# Patient Record
Sex: FEMALE | Race: BLACK OR AFRICAN AMERICAN | NOT HISPANIC OR LATINO | Employment: UNEMPLOYED | URBAN - METROPOLITAN AREA
[De-identification: names, ages, dates, MRNs, and addresses within clinical notes are randomized per-mention and may not be internally consistent; named-entity substitution may affect disease eponyms.]

---

## 2017-10-30 ENCOUNTER — HOSPITAL ENCOUNTER (EMERGENCY)
Facility: HOSPITAL | Age: 27
Discharge: HOME | End: 2017-10-30
Attending: EMERGENCY MEDICINE
Payer: MEDICAID

## 2017-10-30 VITALS
TEMPERATURE: 98 F | BODY MASS INDEX: 40.12 KG/M2 | HEIGHT: 64 IN | OXYGEN SATURATION: 99 % | WEIGHT: 235 LBS | RESPIRATION RATE: 20 BRPM | HEART RATE: 92 BPM | DIASTOLIC BLOOD PRESSURE: 88 MMHG | SYSTOLIC BLOOD PRESSURE: 150 MMHG

## 2017-10-30 DIAGNOSIS — M54.50 LOW BACK PAIN: ICD-10-CM

## 2017-10-30 LAB
B-HCG UR QL: NEGATIVE
CTP QC/QA: YES

## 2017-10-30 PROCEDURE — 25000003 PHARM REV CODE 250: Performed by: PHYSICIAN ASSISTANT

## 2017-10-30 PROCEDURE — 81025 URINE PREGNANCY TEST: CPT | Performed by: EMERGENCY MEDICINE

## 2017-10-30 PROCEDURE — 99284 EMERGENCY DEPT VISIT MOD MDM: CPT | Mod: 25

## 2017-10-30 RX ORDER — ACETAMINOPHEN 325 MG/1
650 TABLET ORAL
Status: DISCONTINUED | OUTPATIENT
Start: 2017-10-30 | End: 2017-10-30 | Stop reason: HOSPADM

## 2017-10-30 RX ORDER — ACETAMINOPHEN 325 MG/1
650 TABLET ORAL
Status: COMPLETED | OUTPATIENT
Start: 2017-10-30 | End: 2017-10-30

## 2017-10-30 RX ORDER — DEXTROMETHORPHAN HYDROBROMIDE, GUAIFENESIN 5; 100 MG/5ML; MG/5ML
650 LIQUID ORAL 3 TIMES DAILY PRN
Qty: 20 TABLET | Refills: 0 | Status: SHIPPED | OUTPATIENT
Start: 2017-10-30 | End: 2018-06-25 | Stop reason: ALTCHOICE

## 2017-10-30 RX ORDER — ORPHENADRINE CITRATE 100 MG/1
100 TABLET, EXTENDED RELEASE ORAL 2 TIMES DAILY PRN
Qty: 20 TABLET | Refills: 0 | Status: SHIPPED | OUTPATIENT
Start: 2017-10-30 | End: 2017-11-09

## 2017-10-30 RX ADMIN — ACETAMINOPHEN 325 MG: 325 TABLET ORAL at 09:10

## 2017-10-30 NOTE — DISCHARGE INSTRUCTIONS
Take medication as prescribed.  Follow-up with primary care physician this week for reevaluation.  Return to ED if any other problems occur.

## 2017-10-30 NOTE — ED PROVIDER NOTES
"Encounter Date: 10/30/2017    SCRIBE #1 NOTE: I, Jairo Hartley, am scribing for, and in the presence of,  Michael Tamayo PA-C. I have scribed the following portions of the note - Other sections scribed: HPI, ROS.       History     Chief Complaint   Patient presents with    Back Pain     "everytime I sit or stand up I feel like I got a pinch right in the middle" xcouple months     CC: Back Pain    HPI: This 27 y.o. Female with PMHx epilepsy, and PSHx cholecystectomy presents to the ED c/o lower middle back pain. Pt describes the pain as "pinches" and denies pain radiation. Pt reports exacerbation when she moves and when laying down to sleep. Pt denies numbness & tingling to the groin, frequency, dysuria, hematuria, vaginal pain, vaginal bleeding, headache, nausea, vomiting, lightheadedness, or dizziness. No prior tx reported at this time. Pt denies recent injury. Pt says she has had back pain since receiving an epidural for her previous pregnancy. Pt reports giving birth in 2011. Pt says her back pain medication has not been working and she is looking for a new doctor. Pt says she is on depo shots. Pt denies past hx HTN.  No radiculopathy or paresthesia.  Symptoms are intermittent.      The history is provided by the patient. No  was used.     Review of patient's allergies indicates:  No Known Allergies  Past Medical History:   Diagnosis Date    Epilepsy      Past Surgical History:   Procedure Laterality Date    CHOLECYSTECTOMY       History reviewed. No pertinent family history.  Social History   Substance Use Topics    Smoking status: Never Smoker    Smokeless tobacco: Never Used    Alcohol use No     Review of Systems   Constitutional: Negative for fever.   HENT: Negative for sore throat.    Respiratory: Negative for shortness of breath.    Cardiovascular: Negative for chest pain.   Gastrointestinal: Negative for nausea and vomiting.   Genitourinary: Negative for dysuria, frequency, " hematuria, vaginal bleeding and vaginal pain.   Musculoskeletal: Positive for back pain (lower middle).   Skin: Negative for rash.   Neurological: Negative for dizziness, weakness, light-headedness, numbness and headaches.   Hematological: Does not bruise/bleed easily.       Physical Exam     Initial Vitals [10/30/17 0902]   BP Pulse Resp Temp SpO2   (!) 157/100 100 20 97.8 °F (36.6 °C) 98 %      MAP       119         Physical Exam    Nursing note and vitals reviewed.  Constitutional: She appears well-developed and well-nourished. She is not diaphoretic. No distress.   HENT:   Head: Normocephalic and atraumatic.   Eyes: Conjunctivae and EOM are normal. Pupils are equal, round, and reactive to light.   Neck: Normal range of motion. Neck supple. No tracheal deviation present.   Cardiovascular: Normal heart sounds.   Pulmonary/Chest: Breath sounds normal. No stridor. No respiratory distress. She has no wheezes.   Abdominal: Soft. Bowel sounds are normal. She exhibits no distension. There is no tenderness.   Musculoskeletal: Normal range of motion.   Mild TTP upper aspect of lumbar spine.  No obvious bony abnormality or deformity.  No swelling.  No ecchymosis or open wound.  No significant paraspinal musculature tenderness to palpation.   Lymphadenopathy:     She has no cervical adenopathy.   Neurological: She is alert and oriented to person, place, and time.   Skin: Skin is warm and dry. Capillary refill takes less than 2 seconds.   Psychiatric: She has a normal mood and affect. Her behavior is normal. Judgment and thought content normal.         ED Course   Procedures  Labs Reviewed   POCT URINE PREGNANCY             Medical Decision Making:   Initial Assessment:   27-year-old female chief complaint low back pain times years.  No recent trauma.  No radiculopathy or paresthesia.  Differential Diagnosis:   Contusion, fracture, sprain/strain, muscle spasm, nerve impingement  Clinical Tests:   Radiological Study: Ordered  and Reviewed  ED Management:  Patient overall well-appearing, in no acute distress, afebrile, she is hypertensive. She denies lightheadedness/dizziness, headache, n/v, or visual disturbance.       Patient presents to ED complaining of multiple years of lower back pain.  Patient denies trauma or recent injury.  She states she suspects back pain originated from her epidural injection during her last pregnancy.  Patient denies radiculopathy or paresthesia.  She does state the pain is sharp and worse with movement.  She denies saddle anesthesia.  No GI/ issues, no bowel or bladder incontinence.  I do not suspect cauda equina or emergent spinal process.  No difficulty with ambulation.  On exam, very mild tenderness to palpation to upper aspect of lumbar spine without obvious bony abnormality or deformity.  No step-off.  No swelling, ecchymosis, or open wound.  Warmth or erythema.  No suspicion for cellulitis.  X-ray negative for acute fracture.  I will discharge with anti-inflammatories, muscle relaxers, and have patient follow-up with her primary for reevaluation.  She does understand and agree.  Return precautions given.  I feel she is safe and stable for discharge without further intervention.  Other:   I have discussed this case with another health care provider.       <> Summary of the Discussion: I have discussed this case with Dr. Brito.            Scribe Attestation:   Scribe #1: I performed the above scribed service and the documentation accurately describes the services I performed. I attest to the accuracy of the note.    Attending Attestation:     Physician Attestation Statement for NP/PA:   I discussed this assessment and plan of this patient with the NP/PA, but I did not personally examine the patient. The face to face encounter was performed by the NP/PA.        Physician Attestation for Scribe:  Physician Attestation Statement for Scribe #1: I, Michael Tamayo PA-C, reviewed documentation, as scribed  by Jairo Hartley in my presence, and it is both accurate and complete.                 ED Course      Clinical Impression:   The encounter diagnosis was Low back pain.    Disposition:   Disposition: Discharged  Condition: Stable                        Michael Tamayo PA-C  10/30/17 1027       Michael Tamyao PA-C  10/30/17 1033       Jarad Brito MD  10/31/17 6624

## 2018-06-25 ENCOUNTER — HOSPITAL ENCOUNTER (EMERGENCY)
Facility: HOSPITAL | Age: 28
Discharge: HOME | End: 2018-06-25
Payer: MEDICAID

## 2018-06-25 VITALS
BODY MASS INDEX: 41.48 KG/M2 | RESPIRATION RATE: 19 BRPM | HEART RATE: 108 BPM | OXYGEN SATURATION: 100 % | TEMPERATURE: 99 F | DIASTOLIC BLOOD PRESSURE: 92 MMHG | SYSTOLIC BLOOD PRESSURE: 143 MMHG | HEIGHT: 64 IN | WEIGHT: 243 LBS

## 2018-06-25 DIAGNOSIS — B00.1 FEVER BLISTER: Primary | ICD-10-CM

## 2018-06-25 LAB
B-HCG UR QL: NEGATIVE
CTP QC/QA: YES

## 2018-06-25 PROCEDURE — 81025 URINE PREGNANCY TEST: CPT | Performed by: EMERGENCY MEDICINE

## 2018-06-25 PROCEDURE — 99283 EMERGENCY DEPT VISIT LOW MDM: CPT

## 2018-06-25 RX ORDER — IBUPROFEN 600 MG/1
600 TABLET ORAL EVERY 6 HOURS PRN
Qty: 20 TABLET | Refills: 0 | Status: SHIPPED | OUTPATIENT
Start: 2018-06-25 | End: 2018-06-28

## 2018-06-25 RX ORDER — VALACYCLOVIR HYDROCHLORIDE 1 G/1
2000 TABLET, FILM COATED ORAL EVERY 12 HOURS
Qty: 4 TABLET | Refills: 0 | Status: SHIPPED | OUTPATIENT
Start: 2018-06-25 | End: 2018-07-03

## 2018-06-25 NOTE — ED NOTES
Neuro: AAOx3  Resp: Airway patent, respirations even/unlabored  Cardiac: Skin pink/warm/dry, pulses intact  Abdomen: Soft, non-tender to palpation, denies N/V/D  Musculoskeletal: Moves all extremities equally, ROM intact  Swelling to bottom lip x's 2 days

## 2018-06-25 NOTE — ED PROVIDER NOTES
Encounter Date: 6/25/2018       History     Chief Complaint   Patient presents with    Lip Swelling     pt reports she woke up with bottom lip swollen this morning. pt reports using an OTC medicine for a mouth lesion last week.     28 y/o female with fever blister for 1 week. Pt states it is getting worse. She denies fever, chills, SOB, or chest pain. She states that she has experience this before but it has not lasted that long. Pt contacted her PCP and a medication was called in, but only 1 pill. She does not know the name of the medication.       The history is provided by the patient.     Review of patient's allergies indicates:  No Known Allergies  Past Medical History:   Diagnosis Date    Epilepsy      Past Surgical History:   Procedure Laterality Date    CHOLECYSTECTOMY       History reviewed. No pertinent family history.  Social History   Substance Use Topics    Smoking status: Never Smoker    Smokeless tobacco: Never Used    Alcohol use No     Review of Systems   Constitutional: Negative for chills and fever.   HENT: Positive for mouth sores. Negative for drooling and ear discharge.    Respiratory: Negative.  Negative for shortness of breath.    Cardiovascular: Negative.  Negative for chest pain.       Physical Exam     Initial Vitals [06/25/18 1743]   BP Pulse Resp Temp SpO2   (!) 143/92 108 19 98.9 °F (37.2 °C) 100 %      MAP       --         Physical Exam    Nursing note and vitals reviewed.  Constitutional: She appears well-developed and well-nourished.   HENT:   Mouth/Throat: Oropharynx is clear and moist and mucous membranes are normal.       Cardiovascular: Normal rate, regular rhythm, normal heart sounds and intact distal pulses. Exam reveals no gallop and no friction rub.    No murmur heard.  Pulmonary/Chest: Breath sounds normal. No respiratory distress. She has no wheezes. She has no rhonchi. She has no rales. She exhibits no tenderness.         ED Course   Procedures  Labs Reviewed   POCT  URINE PREGNANCY          Imaging Results    None          Medical Decision Making:   Initial Assessment:   26 y/o female which present with fever blister to lower lip  Differential Diagnosis:   Herpes virus, impetigo  ED Management:  Pt examined. Pt did not receive full prescription for treatment of herpes fever blister or was resistant. Meds sent to pharmacy. Pt educated on importance of returning to ED with worsening of symptoms or signs of infection. Pt voiced understanding.                       Clinical Impression:   The encounter diagnosis was Fever blister.                             Tiffanie Wiggins, [facility]  06/25/18 2544

## 2018-07-03 ENCOUNTER — HOSPITAL ENCOUNTER (EMERGENCY)
Facility: HOSPITAL | Age: 28
Discharge: HOME | End: 2018-07-03
Attending: EMERGENCY MEDICINE
Payer: MEDICAID

## 2018-07-03 VITALS
HEART RATE: 100 BPM | TEMPERATURE: 98 F | WEIGHT: 235 LBS | RESPIRATION RATE: 16 BRPM | SYSTOLIC BLOOD PRESSURE: 126 MMHG | DIASTOLIC BLOOD PRESSURE: 87 MMHG | OXYGEN SATURATION: 99 % | BODY MASS INDEX: 40.34 KG/M2

## 2018-07-03 DIAGNOSIS — B00.1 FEVER BLISTER: Primary | ICD-10-CM

## 2018-07-03 LAB
B-HCG UR QL: NEGATIVE
CTP QC/QA: YES

## 2018-07-03 PROCEDURE — 81025 URINE PREGNANCY TEST: CPT | Performed by: EMERGENCY MEDICINE

## 2018-07-03 PROCEDURE — 99284 EMERGENCY DEPT VISIT MOD MDM: CPT | Mod: 25

## 2018-07-03 RX ORDER — LIDOCAINE HYDROCHLORIDE 20 MG/ML
SOLUTION OROPHARYNGEAL
Qty: 30 ML | Refills: 0 | Status: SHIPPED | OUTPATIENT
Start: 2018-07-03 | End: 2021-11-29

## 2018-07-03 RX ORDER — ACYCLOVIR 50 MG/G
OINTMENT TOPICAL
Qty: 30 G | Refills: 0 | Status: SHIPPED | OUTPATIENT
Start: 2018-07-03 | End: 2018-07-17

## 2018-07-03 RX ORDER — IBUPROFEN 600 MG/1
600 TABLET ORAL EVERY 6 HOURS PRN
Qty: 20 TABLET | Refills: 0 | OUTPATIENT
Start: 2018-07-03 | End: 2018-09-06

## 2018-07-03 RX ORDER — ACETAMINOPHEN 500 MG
1000 TABLET ORAL EVERY 6 HOURS PRN
Qty: 30 TABLET | Refills: 0 | OUTPATIENT
Start: 2018-07-03 | End: 2019-09-15

## 2018-07-03 RX ORDER — VALACYCLOVIR HYDROCHLORIDE 1 G/1
2000 TABLET, FILM COATED ORAL EVERY 12 HOURS
Qty: 4 TABLET | Refills: 1 | Status: SHIPPED | OUTPATIENT
Start: 2018-07-03 | End: 2021-11-29

## 2018-07-03 NOTE — ED NOTES
Neuro: AAOx3  HEENT: WDL  Resp: Airway patent, respirations even/unlabored. No SOB noted.  Cardiac: Skin pink/warm/dry, pulses intact. Pt denies any chest pain  Abdomen: Soft, non-tender to palpation, denies N/V/D  : No signs or symptoms of urinary disturbances  Musculoskeletal: Moves all extremities equally, ROM intact  Skin: Pt reports blisters on lips since last week. Skin is dry.

## 2018-07-04 NOTE — ED PROVIDER NOTES
"Encounter Date: 7/3/2018       History     Chief Complaint   Patient presents with    fever blister     Pt states," [place] and it hurts."     27-year-old female chief complaint fever blister to both lips.  Patient states she has been applying Neosporin but it keep spreading.  Patient states initially she was seen here a few weeks ago for having fever blisters on her lower lip now she has a whole bunch on the upper lip.  Patient states the pain is horrible.  Patient states that is limiting her ability to eat.  Patient states the pain makes it hard to eat as much as she wants to.  She is able to eat some things.  She does cannot open her mouth wide.  Patient states this is our 1st fever blister.  It started after visiting the [employer].          Review of patient's allergies indicates:  No Known Allergies  Past Medical History:   Diagnosis Date    Epilepsy      Past Surgical History:   Procedure Laterality Date    CHOLECYSTECTOMY       No family history on file.  Social History   Substance Use Topics    Smoking status: Never Smoker    Smokeless tobacco: Never Used    Alcohol use No     Review of Systems   Constitutional: Negative for activity change, appetite change, chills and fever.   HENT: Negative for sore throat.    Respiratory: Negative for shortness of breath.    Cardiovascular: Negative for chest pain.   Gastrointestinal: Negative for nausea.   Genitourinary: Negative for dysuria.   Musculoskeletal: Negative for back pain.   Skin: Positive for wound. Negative for rash.   Neurological: Negative for weakness and headaches.   Hematological: Does not bruise/bleed easily.   All other systems reviewed and are negative.      Physical Exam     Initial Vitals [07/03/18 1702]   BP Pulse Resp Temp SpO2   126/87 100 16 98 °F (36.7 °C) 99 %      MAP       --         Physical Exam    Nursing note and vitals reviewed.  Constitutional: She appears well-developed and " well-nourished.   HENT:   Head: Normocephalic and atraumatic.   Right Ear: External ear normal.   Left Ear: External ear normal.   Nose: Nose normal.   Mouth/Throat: Oropharynx is clear and moist.       The multiple ulcerations to both upper and lower lips   Eyes: Conjunctivae and EOM are normal. Pupils are equal, round, and reactive to light.   Neck: Normal range of motion. Neck supple.   Cardiovascular: Normal rate, regular rhythm, normal heart sounds and intact distal pulses.   Pulmonary/Chest: Breath sounds normal. No respiratory distress. She has no wheezes. She has no rhonchi. She has no rales. She exhibits no tenderness.   Abdominal: Soft. Bowel sounds are normal. She exhibits no distension and no mass. There is no tenderness. There is no rebound and no guarding.   Musculoskeletal: Normal range of motion.   Neurological: She is alert and oriented to person, place, and time. She has normal strength. No cranial nerve deficit or sensory deficit.   Skin: Skin is warm and dry. Capillary refill takes less than 2 seconds. No abscess noted.   Psychiatric: She has a normal mood and affect.         ED Course   Procedures  Labs Reviewed   POCT URINE PREGNANCY          Imaging Results    None                               Clinical Impression:   The encounter diagnosis was Fever blister.                             Ling Ruff DO  07/04/18 1020

## 2018-07-07 ENCOUNTER — HOSPITAL ENCOUNTER (EMERGENCY)
Facility: HOSPITAL | Age: 28
Discharge: HOME | End: 2018-07-07
Attending: INTERNAL MEDICINE
Payer: MEDICAID

## 2018-07-07 VITALS
DIASTOLIC BLOOD PRESSURE: 85 MMHG | RESPIRATION RATE: 17 BRPM | OXYGEN SATURATION: 100 % | HEART RATE: 95 BPM | HEIGHT: 64 IN | SYSTOLIC BLOOD PRESSURE: 144 MMHG | TEMPERATURE: 98 F | BODY MASS INDEX: 40.12 KG/M2 | WEIGHT: 235 LBS

## 2018-07-07 DIAGNOSIS — L01.00 IMPETIGO: Primary | ICD-10-CM

## 2018-07-07 LAB
B-HCG UR QL: NEGATIVE
CTP QC/QA: YES

## 2018-07-07 PROCEDURE — 99283 EMERGENCY DEPT VISIT LOW MDM: CPT | Mod: 25

## 2018-07-07 PROCEDURE — 25000003 PHARM REV CODE 250: Performed by: INTERNAL MEDICINE

## 2018-07-07 PROCEDURE — 81025 URINE PREGNANCY TEST: CPT | Performed by: INTERNAL MEDICINE

## 2018-07-07 RX ORDER — MUPIROCIN 20 MG/G
OINTMENT TOPICAL 3 TIMES DAILY
Qty: 30 G | Refills: 1 | Status: SHIPPED | OUTPATIENT
Start: 2018-07-07 | End: 2018-07-14

## 2018-07-07 RX ORDER — CEPHALEXIN 500 MG/1
500 CAPSULE ORAL
Status: COMPLETED | OUTPATIENT
Start: 2018-07-07 | End: 2018-07-07

## 2018-07-07 RX ORDER — CEPHALEXIN 500 MG/1
500 CAPSULE ORAL EVERY 12 HOURS
Qty: 20 CAPSULE | Refills: 0 | Status: SHIPPED | OUTPATIENT
Start: 2018-07-07 | End: 2018-07-17

## 2018-07-07 RX ADMIN — CEPHALEXIN 500 MG: 500 CAPSULE ORAL at 08:07

## 2018-07-08 NOTE — ED PROVIDER NOTES
Encounter Date: 7/7/2018    SCRIBE #1 NOTE: I, Ceci Lewis, am scribing for, and in the presence of,  Dr. Landeros. I have scribed the entire note.       History     Chief Complaint   Patient presents with    Oral Swelling     pt c/o lip swelling and burn s/p taking cold sore medication x 1 week     This is a 27 y.o female who presents with upper and lower lip burns and swelling after taking cold sore ointment for 1 week. She notes discoloration on her upper and lower lips. She was treated here for a cold sore and was given medication. She has not followed up with her PCP for this complaint. She rates her pain as a 4/10. She denies nausea, fever, vomiting, and diarrhea.       The history is provided by the patient.     Review of patient's allergies indicates:  No Known Allergies  Past Medical History:   Diagnosis Date    Epilepsy      Past Surgical History:   Procedure Laterality Date    CHOLECYSTECTOMY       No family history on file.  Social History   Substance Use Topics    Smoking status: Never Smoker    Smokeless tobacco: Never Used    Alcohol use No     Review of Systems   Constitutional: Negative for chills and fever.   Gastrointestinal: Negative for nausea and vomiting.   Skin: Positive for wound.   All other systems reviewed and are negative.      Physical Exam     Initial Vitals [07/07/18 1942]   BP Pulse Resp Temp SpO2   (!) 144/85 95 18 98.3 °F (36.8 °C) 100 %      MAP       --         Physical Exam    Nursing note and vitals reviewed.  Constitutional: She appears well-developed and well-nourished. No distress.   HENT:   Head: Normocephalic and atraumatic.   Mouth/Throat: Oropharynx is clear and moist. No oropharyngeal exudate.   Eyes: EOM are normal.   Cardiovascular: Normal rate, regular rhythm and normal heart sounds. Exam reveals no gallop and no friction rub.    No murmur heard.  Pulmonary/Chest: Breath sounds normal. No respiratory distress. She has no wheezes. She has no rhonchi. She has no  rales.   Musculoskeletal: Normal range of motion.   Neurological: She is alert and oriented to person, place, and time.   Skin: Skin is warm and dry. No rash noted.   Multiple pustular lesions to the lower lip, some with honey colored crusting. Multiple healing ulcerative lesions to the upper lip.         ED Course   Procedures  Labs Reviewed   POCT URINE PREGNANCY          Imaging Results    None          Medical Decision Making:   Initial Assessment:   This is a 27 y.o female who presents with upper and lower lip burns and swelling after taking a cold sore ointment for 1 week. She notes discoloration on her upper and lower lips. She was treated here for a cold sore and was given medication. She has not followed up with her PCP for this complaint. She rates her pain as a 4/10. She denies nausea, fever, vomiting, and diarrhea.   Clinical Tests:   Lab Tests: Ordered and Reviewed            Scribe Attestation:   Scribe #1: I performed the above scribed service and the documentation accurately describes the services I performed. I attest to the accuracy of the note.        This document was produced by a scribe under my direction and in my presence. I agree with the content of the note and have made any necessary edits.     Dr. Landeros    07/18/2018 5:55 AM          Clinical Impression:     1. Impetigo        Disposition:   Disposition: Discharged  Condition: Stable                        Luigi Landeros MD  07/18/18 3320

## 2018-09-06 ENCOUNTER — HOSPITAL ENCOUNTER (EMERGENCY)
Facility: HOSPITAL | Age: 28
Discharge: HOME | End: 2018-09-06
Attending: EMERGENCY MEDICINE
Payer: MEDICAID

## 2018-09-06 VITALS
TEMPERATURE: 100 F | HEIGHT: 64 IN | HEART RATE: 90 BPM | OXYGEN SATURATION: 100 % | RESPIRATION RATE: 19 BRPM | WEIGHT: 235 LBS | SYSTOLIC BLOOD PRESSURE: 134 MMHG | BODY MASS INDEX: 40.12 KG/M2 | DIASTOLIC BLOOD PRESSURE: 88 MMHG

## 2018-09-06 DIAGNOSIS — H66.92 LEFT OTITIS MEDIA, UNSPECIFIED OTITIS MEDIA TYPE: ICD-10-CM

## 2018-09-06 DIAGNOSIS — N39.0 URINARY TRACT INFECTION WITHOUT HEMATURIA, SITE UNSPECIFIED: Primary | ICD-10-CM

## 2018-09-06 LAB
B-HCG UR QL: NEGATIVE
BILIRUBIN, POC UA: NEGATIVE
BLOOD, POC UA: ABNORMAL
CLARITY, POC UA: CLEAR
COLOR, POC UA: YELLOW
CTP QC/QA: YES
GLUCOSE, POC UA: NEGATIVE
KETONES, POC UA: NEGATIVE
LEUKOCYTE EST, POC UA: ABNORMAL
NITRITE, POC UA: NEGATIVE
PH UR STRIP: 7 [PH]
POCT GLUCOSE: 77 MG/DL (ref 70–110)
PROTEIN, POC UA: NEGATIVE
SPECIFIC GRAVITY, POC UA: 1.02
UROBILINOGEN, POC UA: 2 E.U./DL

## 2018-09-06 PROCEDURE — 82962 GLUCOSE BLOOD TEST: CPT

## 2018-09-06 PROCEDURE — 87088 URINE BACTERIA CULTURE: CPT

## 2018-09-06 PROCEDURE — 87804 INFLUENZA ASSAY W/OPTIC: CPT

## 2018-09-06 PROCEDURE — 81025 URINE PREGNANCY TEST: CPT | Performed by: EMERGENCY MEDICINE

## 2018-09-06 PROCEDURE — 85025 COMPLETE CBC W/AUTO DIFF WBC: CPT

## 2018-09-06 PROCEDURE — 87077 CULTURE AEROBIC IDENTIFY: CPT

## 2018-09-06 PROCEDURE — 80053 COMPREHEN METABOLIC PANEL: CPT

## 2018-09-06 PROCEDURE — 87086 URINE CULTURE/COLONY COUNT: CPT

## 2018-09-06 PROCEDURE — 87186 SC STD MICRODIL/AGAR DIL: CPT

## 2018-09-06 PROCEDURE — 99283 EMERGENCY DEPT VISIT LOW MDM: CPT

## 2018-09-06 RX ORDER — IBUPROFEN 600 MG/1
600 TABLET ORAL EVERY 6 HOURS PRN
Qty: 30 TABLET | Refills: 0 | OUTPATIENT
Start: 2018-09-06 | End: 2019-09-15

## 2018-09-06 RX ORDER — CEFDINIR 300 MG/1
300 CAPSULE ORAL 2 TIMES DAILY
Qty: 20 CAPSULE | Refills: 0 | Status: SHIPPED | OUTPATIENT
Start: 2018-09-06 | End: 2018-09-16

## 2018-09-06 NOTE — ED PROVIDER NOTES
"Encounter Date: 9/6/2018       History     Chief Complaint   Patient presents with    Generalized Body Aches     pt reports body aches and feeling weak x 3 days. pt denies fever, chest pain or SOB.    Back Pain     pt states "When I'm at work, I stand on my feet all day and that cause my lower back to hurt". pt denies any urinary problems.     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 Pt has a hx of epilepsy.      The history is provided by the patient.   Back Pain    This is a new problem. The current episode started several days ago. The problem occurs 5 - 10 times per day. The problem has been gradually worsening. The pain is associated with no known injury. The pain is present in the lumbar spine. The pain does not radiate. The pain is at a severity of 7/10. Pertinent negatives include no chest pain, no fever, no numbness, no abdominal pain, no bowel incontinence, no perianal numbness, no dysuria, no leg pain and no weakness. The treatment provided no relief.     Review of patient's allergies indicates:  No Known Allergies  Past Medical History:   Diagnosis Date    Epilepsy      Past Surgical History:   Procedure Laterality Date    CHOLECYSTECTOMY       History reviewed. No pertinent family history.  Social History     Tobacco Use    Smoking status: Never Smoker    Smokeless tobacco: Never Used   Substance Use Topics    Alcohol use: No    Drug use: No     Review of Systems   Constitutional: Negative.  Negative for chills and fever.        Generalized weakness   HENT: Negative.  Negative for congestion.    Eyes: Negative.    Respiratory: Negative.  Negative for chest tightness and shortness of breath.    Cardiovascular: Negative.  Negative for chest pain.   Gastrointestinal: Negative for " abdominal pain, bowel incontinence and vomiting.   Endocrine: Negative.    Genitourinary: Negative.  Negative for dysuria and hematuria.   Musculoskeletal: Positive for back pain. Negative for neck pain.   Skin: Negative.  Negative for rash.   Allergic/Immunologic: Negative for immunocompromised state.   Neurological: Negative.  Negative for weakness and numbness.   Hematological: Does not bruise/bleed easily.   Psychiatric/Behavioral: Negative.    All other systems reviewed and are negative.      Physical Exam     Initial Vitals [09/06/18 1645]   BP Pulse Resp Temp SpO2   (!) 140/94 101 19 99.5 °F (37.5 °C) 100 %      MAP       --         Physical Exam    Nursing note and vitals reviewed.  Constitutional: Vital signs are normal. She appears well-developed. She is cooperative. She does not appear ill.   HENT:   Right Ear: Tympanic membrane and external ear normal.   Left Ear: External ear normal. Tympanic membrane is erythematous.   Nose: Nose normal.   Mouth/Throat: Uvula is midline, oropharynx is clear and moist and mucous membranes are normal.   Eyes: Conjunctivae and lids are normal. Pupils are equal, round, and reactive to light.   Neck: Normal range of motion. Neck supple.   Cardiovascular: Normal rate, regular rhythm, S1 normal, S2 normal and normal heart sounds.   Pulmonary/Chest: Effort normal and breath sounds normal.   Abdominal: Soft. Normal appearance and bowel sounds are normal. There is no tenderness. There is no CVA tenderness.   Musculoskeletal: Normal range of motion.   Neurological: She is alert and oriented to person, place, and time.   Skin: Skin is warm, dry and intact.   Psychiatric: She has a normal mood and affect. Her speech is normal. Thought content normal. Cognition and memory are normal.         ED Course   Procedures  Labs Reviewed   POCT URINALYSIS W/O SCOPE - Abnormal; Notable for the following components:       Result Value    Glucose, UA Negative (*)     Bilirubin, UA Negative (*)      Ketones, UA Negative (*)     Blood, UA Trace-intact (*)     Protein, UA Negative (*)     Urobilinogen, UA 2.0 (*)     Nitrite, UA Negative (*)     Leukocytes, UA 1+ (*)     All other components within normal limits   CULTURE, URINE   POCT URINE PREGNANCY   POCT URINALYSIS W/O SCOPE   POCT GLUCOSE          Imaging Results    None          Medical Decision Making:   Initial Assessment:   A 27 years old female with generalized body aches and low back pain for 3 days. Pt denies dysuria, vaginal discharge, abdominal pain, nasal congestion and cough.  Differential Diagnosis:   Low back pain   Urinary tract infection  Viral illness  Clinical Tests:   Lab Tests: Ordered and Reviewed  ED Management:  Physical exam. UPT negative.   Urinalysis blood trace, leukocytes +1. Urine culture pending.  Pt discharged home with Cefidinir 300 mg BID for 10 days.  Follow-up with PCP in 2-3 days.   Return to ED for worsening of symptoms.                       Clinical Impression:   The primary encounter diagnosis was Urinary tract infection without hematuria, site unspecified. A diagnosis of Left otitis media, unspecified otitis media type was also pertinent to this visit.                             Dalia Alicea, [facility]  09/07/18 0126

## 2018-09-08 LAB — BACTERIA UR CULT: NORMAL

## 2018-11-14 ENCOUNTER — TELEPHONE (OUTPATIENT)
Dept: NEUROLOGY | Facility: CLINIC | Age: 28
End: 2018-11-14

## 2018-11-14 NOTE — TELEPHONE ENCOUNTER
----- Message from Jayson Martell sent at 11/14/2018  3:01 PM CST -----  Contact: Patient @ [phone]  Patient is calling to schedule a NP appt to consult for seizures, pls call

## 2018-12-07 ENCOUNTER — TELEPHONE (OUTPATIENT)
Dept: NEUROLOGY | Facility: CLINIC | Age: 28
End: 2018-12-07

## 2018-12-07 NOTE — TELEPHONE ENCOUNTER
----- Message from Jayson Martell sent at 12/7/2018 11:40 AM CST -----  Contact: Patient @ [phone]  Patient calling to r/s the 12-10th apptotf call

## 2019-09-15 ENCOUNTER — HOSPITAL ENCOUNTER (EMERGENCY)
Facility: HOSPITAL | Age: 29
Discharge: HOME | End: 2019-09-15
Attending: EMERGENCY MEDICINE
Payer: MEDICAID

## 2019-09-15 VITALS
BODY MASS INDEX: 43.02 KG/M2 | DIASTOLIC BLOOD PRESSURE: 78 MMHG | OXYGEN SATURATION: 99 % | HEART RATE: 84 BPM | RESPIRATION RATE: 20 BRPM | HEIGHT: 64 IN | SYSTOLIC BLOOD PRESSURE: 134 MMHG | WEIGHT: 252 LBS | TEMPERATURE: 98 F

## 2019-09-15 DIAGNOSIS — M79.601 RIGHT ARM PAIN: Primary | ICD-10-CM

## 2019-09-15 DIAGNOSIS — J30.2 SEASONAL ALLERGIES: ICD-10-CM

## 2019-09-15 LAB
B-HCG UR QL: NEGATIVE
CTP QC/QA: YES

## 2019-09-15 PROCEDURE — 81025 URINE PREGNANCY TEST: CPT | Mod: ER | Performed by: EMERGENCY MEDICINE

## 2019-09-15 PROCEDURE — 99284 EMERGENCY DEPT VISIT MOD MDM: CPT | Mod: 25,ER

## 2019-09-15 PROCEDURE — 96372 THER/PROPH/DIAG INJ SC/IM: CPT | Mod: ER

## 2019-09-15 PROCEDURE — 63600175 PHARM REV CODE 636 W HCPCS: Mod: ER | Performed by: EMERGENCY MEDICINE

## 2019-09-15 RX ORDER — LORATADINE 10 MG/1
10 TABLET ORAL DAILY
Qty: 60 TABLET | Refills: 0 | Status: SHIPPED | OUTPATIENT
Start: 2019-09-15 | End: 2021-11-29

## 2019-09-15 RX ORDER — METHOCARBAMOL 750 MG/1
1500 TABLET, FILM COATED ORAL 3 TIMES DAILY PRN
Qty: 30 TABLET | Refills: 0 | Status: SHIPPED | OUTPATIENT
Start: 2019-09-15 | End: 2019-09-20

## 2019-09-15 RX ORDER — KETOROLAC TROMETHAMINE 30 MG/ML
30 INJECTION, SOLUTION INTRAMUSCULAR; INTRAVENOUS
Status: COMPLETED | OUTPATIENT
Start: 2019-09-15 | End: 2019-09-15

## 2019-09-15 RX ORDER — DICLOFENAC SODIUM 10 MG/G
2 GEL TOPICAL 4 TIMES DAILY
Qty: 1 TUBE | Refills: 0 | Status: SHIPPED | OUTPATIENT
Start: 2019-09-15 | End: 2021-11-29

## 2019-09-15 RX ORDER — ACETAMINOPHEN 500 MG
1000 TABLET ORAL EVERY 6 HOURS PRN
Qty: 30 TABLET | Refills: 0 | Status: ON HOLD | OUTPATIENT
Start: 2019-09-15 | End: 2023-10-06

## 2019-09-15 RX ORDER — FLUTICASONE PROPIONATE 50 MCG
1 SPRAY, SUSPENSION (ML) NASAL 2 TIMES DAILY
Qty: 1 BOTTLE | Refills: 0 | Status: SHIPPED | OUTPATIENT
Start: 2019-09-15 | End: 2021-11-29

## 2019-09-15 RX ORDER — IBUPROFEN 600 MG/1
600 TABLET ORAL EVERY 6 HOURS PRN
Qty: 20 TABLET | Refills: 0 | Status: SHIPPED | OUTPATIENT
Start: 2019-09-15 | End: 2021-11-29

## 2019-09-15 RX ADMIN — KETOROLAC TROMETHAMINE 30 MG: 30 INJECTION, SOLUTION INTRAMUSCULAR; INTRAVENOUS at 01:09

## 2019-09-15 NOTE — ED PROVIDER NOTES
"Encounter Date: 9/15/2019    SCRIBE #1 NOTE: I, Nicky Valverde, am scribing for, and in the presence of,  Dr. Ruff. I have scribed the following portions of the note - Other sections scribed: HPI, ROS, PE.       History     Chief Complaint   Patient presents with    Arm Injury     reports right arm pain for 3+days, denies injury, also states right hand "tingles when I touch something"     Perla Jean is a 29 y.o. female who presents to the ED complaining of pain, numbness, and tingling to her right arm since 19, when she had blood drawn from her right AC. Pt reports decreased sensation from right elbow to right thumb. She has not seen her PCP or taken anything for her Sx. Patient also endorses intermittent HA with eye pain secondary to HA, eye itching, and rhinorrhea. Patient does report continued itchy watery eyes and runny nose. She states that her last HA was 2 days ago, and took Excedrin with relief.  Patient denies any headache at this time.  Patient has no eye pain at this time.  Patient states it was not the worst headache of her life.  Denies neck pain. Denies HTN, DM; no known allergies to medications, and does not take any daily medications. She last had a depo shot administered on 19. LNMP was on 19; pt is  A0. PSHx includes gallstone removal.    The history is provided by the patient. No  was used.     Review of patient's allergies indicates:  No Known Allergies  Past Medical History:   Diagnosis Date    Epilepsy      Past Surgical History:   Procedure Laterality Date    CHOLECYSTECTOMY      CHOLECYSTECTOMY, LAPAROSCOPIC N/A 2013    Performed by Mayra Romero MD at [facility] OR     History reviewed. No pertinent family history.  Social History     Tobacco Use    Smoking status: Never Smoker    Smokeless tobacco: Never Used   Substance Use Topics    Alcohol use: No    Drug use: No     Review of Systems   Constitutional: Negative for chills and fever. "   HENT: Positive for congestion and rhinorrhea.    Eyes: Positive for pain.   Respiratory: Negative for cough, shortness of breath and wheezing.    Cardiovascular: Negative for chest pain.   Gastrointestinal: Negative for abdominal pain.   Musculoskeletal: Positive for myalgias. Negative for neck pain.   Neurological: Positive for numbness and headaches.   All other systems reviewed and are negative.      Physical Exam     Initial Vitals [09/15/19 1242]   BP Pulse Resp Temp SpO2   (!) 140/84 86 19 99.3 °F (37.4 °C) 99 %      MAP       --         Physical Exam    Nursing note and vitals reviewed.  Constitutional: She appears well-developed and well-nourished.   HENT:   Head: Normocephalic and atraumatic.   Right Ear: External ear normal.   Left Ear: External ear normal.   Nose: Mucosal edema and rhinorrhea present.   Mouth/Throat: Oropharynx is clear and moist.   Postnasal drip noted.   Eyes: Conjunctivae and EOM are normal. Pupils are equal, round, and reactive to light.   Neck: Normal range of motion and phonation normal. Neck supple.   Cardiovascular: Normal rate, regular rhythm, normal heart sounds and intact distal pulses. Exam reveals no gallop and no friction rub.    No murmur heard.  Pulmonary/Chest: Effort normal and breath sounds normal. No stridor. No respiratory distress. She has no wheezes. She has no rhonchi. She has no rales. She exhibits no tenderness.   Abdominal: Soft. Bowel sounds are normal. She exhibits no distension. There is no tenderness. There is no rigidity, no rebound and no guarding.   Musculoskeletal: Normal range of motion. She exhibits no edema.        Right forearm: She exhibits tenderness. She exhibits no bony tenderness, no swelling and no deformity.   On exam: RUE sensation is grossly intact, with no swelling, deformity, bony tenderness, or bruising.  Upper arm circumference: 46 cm, bilaterally  Elbow circumference: 32 cm, bilaterally  Wrist circumference: 17 cm, bilaterally    Neurological: She is alert and oriented to person, place, and time. She has normal strength. No cranial nerve deficit or sensory deficit. GCS score is 15. GCS eye subscore is 4. GCS verbal subscore is 5. GCS motor subscore is 6.   Skin: Skin is warm and dry. Capillary refill takes less than 2 seconds. No bruising and no rash noted.   Psychiatric: She has a normal mood and affect. Her behavior is normal.         ED Course   Procedures  Labs Reviewed   POCT URINE PREGNANCY               Medical Decision Making:   History:   Old Medical Records: I decided to obtain old medical records.  Clinical Tests:   Lab Tests: Ordered and Reviewed  The following lab test(s) were unremarkable: UPT  ED Management:  Will order UPT.  Will treat with ketorolac injection 30 mg.    Will discharge patient home with ibuprofen (ADVIL,MOTRIN) 600 MG tablet, acetaminophen (TYLENOL) 500 MG tablet, diclofenac sodium (VOLTAREN) 1 % Gel, methocarbamol (ROBAXIN) 750 MG Tab, fluticasone propionate (FLONASE) 50 mcg/actuation nasal spray, loratadine (CLARITIN) 10 mg tablet.    Chief complaint: pain, numbness, tingling to RUE  Differential diagnosis:  Seasonal allergies, acute sinusitis, post vena puncture complication, pregnancy, cellulitis, and abscess.  Treatment in the ED: ketorolac injection 30 mg.  Patient reports feeling better after treatment in the ER.    Discussed out patient treatment, prescriptions, lab results.  Follow up with primary care in 1 day.  Fill and take prescriptions as directed.  Return to the ED if symptoms worsen or do not resolve.   Answered questions and discussed discharge plan.    Patient feels better and is ready for discharge.  Follow up with PCP/specialist in 1 day.            Scribe Attestation:   Scribe #1: I performed the above scribed service and the documentation accurately describes the services I performed. I attest to the accuracy of the note.     I, Dr. Ling Ruff, personally performed the services  described in this documentation. This document was produced by a scribe under my direction and in my presence. All medical record entries made by the scribe were at my direction and in my presence.  I have reviewed the chart and agree that the record reflects my personal performance and is accurate and complete. Ling Ruff DO.     09/16/2019 9:49 AM             Clinical Impression:     1. Right arm pain    2. Seasonal allergies                                   Ling Ruff DO  09/16/19 0950       Ling Ruff DO  09/16/19 0951

## 2019-09-15 NOTE — ED NOTES
"Pt reports paraesthesias in her R Arm since September 4th, pt reports she was walking to the bus stop and the tingling started, symptoms are getting worse since that day  Denies allergies, denies taking any home meds, does report she has been unable to establish herself with a primary care physician for "some years"  Hx of seizures as a child, last seizure 10+ years ago,   "

## 2020-08-19 LAB
HUMAN PAPILLOMAVIRUS (HPV): NORMAL
PAP RECOMMENDATION EXT: NORMAL
PAP SMEAR: NORMAL

## 2021-05-07 ENCOUNTER — PATIENT MESSAGE (OUTPATIENT)
Dept: FAMILY MEDICINE | Facility: CLINIC | Age: 31
End: 2021-05-07

## 2021-05-10 ENCOUNTER — PATIENT MESSAGE (OUTPATIENT)
Dept: RESEARCH | Facility: HOSPITAL | Age: 31
End: 2021-05-10

## 2021-06-28 ENCOUNTER — OFFICE VISIT (OUTPATIENT)
Dept: FAMILY MEDICINE | Facility: CLINIC | Age: 31
End: 2021-06-28
Payer: MEDICAID

## 2021-06-28 VITALS
DIASTOLIC BLOOD PRESSURE: 84 MMHG | SYSTOLIC BLOOD PRESSURE: 126 MMHG | HEIGHT: 64 IN | HEART RATE: 80 BPM | BODY MASS INDEX: 45.07 KG/M2 | WEIGHT: 264 LBS

## 2021-06-28 DIAGNOSIS — Z30.09 ENCOUNTER FOR OTHER GENERAL COUNSELING OR ADVICE ON CONTRACEPTION: ICD-10-CM

## 2021-06-28 DIAGNOSIS — Z00.00 GENERAL MEDICAL EXAM: ICD-10-CM

## 2021-06-28 DIAGNOSIS — Z86.69 HISTORY OF EPILEPSY: ICD-10-CM

## 2021-06-28 DIAGNOSIS — E66.01 CLASS 3 SEVERE OBESITY DUE TO EXCESS CALORIES WITHOUT SERIOUS COMORBIDITY WITH BODY MASS INDEX (BMI) OF 45.0 TO 49.9 IN ADULT: ICD-10-CM

## 2021-06-28 DIAGNOSIS — R22.1 LOCALIZED SWELLING, MASS AND LUMP, NECK: ICD-10-CM

## 2021-06-28 DIAGNOSIS — Z12.4 CERVICAL CANCER SCREENING: Primary | ICD-10-CM

## 2021-06-28 PROCEDURE — 99385 PREV VISIT NEW AGE 18-39: CPT | Mod: S$GLB,,, | Performed by: NURSE PRACTITIONER

## 2021-06-28 PROCEDURE — 99385 PR PREVENTIVE VISIT,NEW,18-39: ICD-10-PCS | Mod: S$GLB,,, | Performed by: NURSE PRACTITIONER

## 2021-06-28 RX ORDER — TIMOLOL MALEATE 5 MG/ML
1 SOLUTION/ DROPS OPHTHALMIC DAILY
COMMUNITY
Start: 2021-03-30 | End: 2021-11-29

## 2021-07-09 ENCOUNTER — HOSPITAL ENCOUNTER (OUTPATIENT)
Dept: RADIOLOGY | Facility: HOSPITAL | Age: 31
Discharge: HOME | End: 2021-07-09
Attending: NURSE PRACTITIONER
Payer: MEDICAID

## 2021-07-09 DIAGNOSIS — R22.1 LOCALIZED SWELLING, MASS AND LUMP, NECK: ICD-10-CM

## 2021-07-09 PROCEDURE — 76536 US EXAM OF HEAD AND NECK: CPT | Mod: TC,PO

## 2021-07-15 LAB
ALBUMIN SERPL-MCNC: 4 G/DL (ref 3.6–5.1)
ALBUMIN/GLOB SERPL: 1.1 (CALC) (ref 1–2.5)
ALP SERPL-CCNC: 100 U/L (ref 31–125)
ALT SERPL-CCNC: 16 U/L (ref 6–29)
APPEARANCE UR: CLEAR
AST SERPL-CCNC: 12 U/L (ref 10–30)
BACTERIA #/AREA URNS HPF: ABNORMAL /HPF
BACTERIA UR CULT: ABNORMAL
BASOPHILS # BLD AUTO: 62 CELLS/UL (ref 0–200)
BASOPHILS NFR BLD AUTO: 0.4 %
BILIRUB SERPL-MCNC: 0.5 MG/DL (ref 0.2–1.2)
BILIRUB UR QL STRIP: NEGATIVE
BUN SERPL-MCNC: 11 MG/DL (ref 7–25)
BUN/CREAT SERPL: ABNORMAL (CALC) (ref 6–22)
CALCIUM SERPL-MCNC: 9.4 MG/DL (ref 8.6–10.2)
CHLORIDE SERPL-SCNC: 103 MMOL/L (ref 98–110)
CHOLEST SERPL-MCNC: 168 MG/DL
CHOLEST/HDLC SERPL: 4.4 (CALC)
CO2 SERPL-SCNC: 25 MMOL/L (ref 20–32)
COLOR UR: YELLOW
CREAT SERPL-MCNC: 0.99 MG/DL (ref 0.5–1.1)
EOSINOPHIL # BLD AUTO: 94 CELLS/UL (ref 15–500)
EOSINOPHIL NFR BLD AUTO: 0.6 %
ERYTHROCYTE [DISTWIDTH] IN BLOOD BY AUTOMATED COUNT: 13.9 % (ref 11–15)
GLOBULIN SER CALC-MCNC: 3.6 G/DL (CALC) (ref 1.9–3.7)
GLUCOSE SERPL-MCNC: 115 MG/DL (ref 65–99)
GLUCOSE UR QL STRIP: NEGATIVE
HCT VFR BLD AUTO: 40.3 % (ref 35–45)
HDLC SERPL-MCNC: 38 MG/DL
HGB BLD-MCNC: 12.7 G/DL (ref 11.7–15.5)
HGB UR QL STRIP: NEGATIVE
HYALINE CASTS #/AREA URNS LPF: ABNORMAL /LPF
KETONES UR QL STRIP: NEGATIVE
LDLC SERPL CALC-MCNC: 110 MG/DL (CALC)
LEUKOCYTE ESTERASE UR QL STRIP: NEGATIVE
LYMPHOCYTES # BLD AUTO: 4290 CELLS/UL (ref 850–3900)
LYMPHOCYTES NFR BLD AUTO: 27.5 %
MCH RBC QN AUTO: 25.5 PG (ref 27–33)
MCHC RBC AUTO-ENTMCNC: 31.5 G/DL (ref 32–36)
MCV RBC AUTO: 80.8 FL (ref 80–100)
MONOCYTES # BLD AUTO: 905 CELLS/UL (ref 200–950)
MONOCYTES NFR BLD AUTO: 5.8 %
NEUTROPHILS # BLD AUTO: ABNORMAL CELLS/UL (ref 1500–7800)
NEUTROPHILS NFR BLD AUTO: 65.7 %
NITRITE UR QL STRIP: NEGATIVE
NONHDLC SERPL-MCNC: 130 MG/DL (CALC)
PH UR STRIP: 7 [PH] (ref 5–8)
PLATELET # BLD AUTO: 438 THOUSAND/UL (ref 140–400)
PMV BLD REES-ECKER: 11.4 FL (ref 7.5–12.5)
POTASSIUM SERPL-SCNC: 4.1 MMOL/L (ref 3.5–5.3)
PROT SERPL-MCNC: 7.6 G/DL (ref 6.1–8.1)
PROT UR QL STRIP: ABNORMAL
RBC # BLD AUTO: 4.99 MILLION/UL (ref 3.8–5.1)
RBC #/AREA URNS HPF: ABNORMAL /HPF
SODIUM SERPL-SCNC: 135 MMOL/L (ref 135–146)
SP GR UR STRIP: 1.02 (ref 1–1.03)
SQUAMOUS #/AREA URNS HPF: ABNORMAL /HPF
TRIGL SERPL-MCNC: 92 MG/DL
TSH SERPL-ACNC: 1.64 MIU/L
WBC # BLD AUTO: 15.6 THOUSAND/UL (ref 3.8–10.8)
WBC #/AREA URNS HPF: ABNORMAL /HPF

## 2021-07-16 ENCOUNTER — TELEPHONE (OUTPATIENT)
Dept: FAMILY MEDICINE | Facility: CLINIC | Age: 31
End: 2021-07-16

## 2021-07-22 ENCOUNTER — OFFICE VISIT (OUTPATIENT)
Dept: FAMILY MEDICINE | Facility: CLINIC | Age: 31
End: 2021-07-22
Payer: MEDICAID

## 2021-07-22 ENCOUNTER — HOSPITAL ENCOUNTER (OUTPATIENT)
Dept: RADIOLOGY | Facility: HOSPITAL | Age: 31
Discharge: HOME | End: 2021-07-22
Attending: NURSE PRACTITIONER
Payer: MEDICAID

## 2021-07-22 VITALS
DIASTOLIC BLOOD PRESSURE: 84 MMHG | SYSTOLIC BLOOD PRESSURE: 138 MMHG | WEIGHT: 263 LBS | HEIGHT: 64 IN | BODY MASS INDEX: 44.9 KG/M2 | HEART RATE: 96 BPM

## 2021-07-22 DIAGNOSIS — M54.9 DORSALGIA, UNSPECIFIED: ICD-10-CM

## 2021-07-22 DIAGNOSIS — G89.29 CHRONIC BILATERAL LOW BACK PAIN WITHOUT SCIATICA: ICD-10-CM

## 2021-07-22 DIAGNOSIS — E66.01 CLASS 3 SEVERE OBESITY DUE TO EXCESS CALORIES WITH SERIOUS COMORBIDITY AND BODY MASS INDEX (BMI) OF 45.0 TO 49.9 IN ADULT: ICD-10-CM

## 2021-07-22 DIAGNOSIS — R73.01 ELEVATED FASTING GLUCOSE: ICD-10-CM

## 2021-07-22 DIAGNOSIS — E11.65 TYPE 2 DIABETES MELLITUS WITH HYPERGLYCEMIA, WITHOUT LONG-TERM CURRENT USE OF INSULIN: ICD-10-CM

## 2021-07-22 DIAGNOSIS — M54.50 CHRONIC BILATERAL LOW BACK PAIN WITHOUT SCIATICA: ICD-10-CM

## 2021-07-22 DIAGNOSIS — D72.829 LEUKOCYTOSIS, UNSPECIFIED TYPE: ICD-10-CM

## 2021-07-22 DIAGNOSIS — G62.9 NEUROPATHY: ICD-10-CM

## 2021-07-22 DIAGNOSIS — G89.29 CHRONIC BILATERAL LOW BACK PAIN WITHOUT SCIATICA: Primary | ICD-10-CM

## 2021-07-22 DIAGNOSIS — M54.50 CHRONIC BILATERAL LOW BACK PAIN WITHOUT SCIATICA: Primary | ICD-10-CM

## 2021-07-22 LAB — HBA1C MFR BLD: 6.4 %

## 2021-07-22 PROCEDURE — 99214 PR OFFICE/OUTPT VISIT, EST, LEVL IV, 30-39 MIN: ICD-10-PCS | Mod: S$GLB,,, | Performed by: NURSE PRACTITIONER

## 2021-07-22 PROCEDURE — 83036 HEMOGLOBIN GLYCOSYLATED A1C: CPT | Mod: QW,,, | Performed by: NURSE PRACTITIONER

## 2021-07-22 PROCEDURE — 72100 X-RAY EXAM L-S SPINE 2/3 VWS: CPT | Mod: TC,PO

## 2021-07-22 PROCEDURE — 99214 OFFICE O/P EST MOD 30 MIN: CPT | Mod: S$GLB,,, | Performed by: NURSE PRACTITIONER

## 2021-07-22 PROCEDURE — 83036 POCT HEMOGLOBIN A1C: ICD-10-PCS | Mod: QW,,, | Performed by: NURSE PRACTITIONER

## 2021-07-22 RX ORDER — METFORMIN HYDROCHLORIDE 500 MG/1
500 TABLET, EXTENDED RELEASE ORAL
Qty: 90 TABLET | Refills: 3 | Status: SHIPPED | OUTPATIENT
Start: 2021-07-22 | End: 2021-11-29

## 2021-07-22 RX ORDER — METHYLPREDNISOLONE 4 MG/1
TABLET ORAL
Qty: 1 PACKAGE | Refills: 0 | Status: SHIPPED | OUTPATIENT
Start: 2021-07-22 | End: 2021-08-12

## 2021-07-27 ENCOUNTER — PATIENT MESSAGE (OUTPATIENT)
Dept: FAMILY MEDICINE | Facility: CLINIC | Age: 31
End: 2021-07-27

## 2021-07-27 DIAGNOSIS — M79.672 BILATERAL FOOT PAIN: Primary | ICD-10-CM

## 2021-07-27 DIAGNOSIS — M79.671 BILATERAL FOOT PAIN: Primary | ICD-10-CM

## 2021-08-02 ENCOUNTER — OFFICE VISIT (OUTPATIENT)
Dept: PODIATRY | Facility: CLINIC | Age: 31
End: 2021-08-02
Payer: MEDICAID

## 2021-08-02 VITALS — HEART RATE: 109 BPM | BODY MASS INDEX: 45.14 KG/M2 | HEIGHT: 64 IN | OXYGEN SATURATION: 95 %

## 2021-08-02 DIAGNOSIS — M54.16 LUMBAR RADICULOPATHY: ICD-10-CM

## 2021-08-02 DIAGNOSIS — M79.672 BILATERAL FOOT PAIN: ICD-10-CM

## 2021-08-02 DIAGNOSIS — M79.671 BILATERAL FOOT PAIN: ICD-10-CM

## 2021-08-02 DIAGNOSIS — G60.9 IDIOPATHIC PERIPHERAL NEUROPATHY: Primary | ICD-10-CM

## 2021-08-02 PROCEDURE — 99203 PR OFFICE/OUTPT VISIT, NEW, LEVL III, 30-44 MIN: ICD-10-PCS | Mod: S$GLB,,, | Performed by: PODIATRIST

## 2021-08-02 PROCEDURE — 99203 OFFICE O/P NEW LOW 30 MIN: CPT | Mod: S$GLB,,, | Performed by: PODIATRIST

## 2021-08-25 ENCOUNTER — TELEPHONE (OUTPATIENT)
Dept: FAMILY MEDICINE | Facility: CLINIC | Age: 31
End: 2021-08-25

## 2021-11-22 ENCOUNTER — TELEPHONE (OUTPATIENT)
Dept: FAMILY MEDICINE | Facility: CLINIC | Age: 31
End: 2021-11-22
Payer: MEDICAID

## 2021-11-29 ENCOUNTER — OFFICE VISIT (OUTPATIENT)
Dept: FAMILY MEDICINE | Facility: CLINIC | Age: 31
End: 2021-11-29
Payer: MEDICAID

## 2021-11-29 VITALS
DIASTOLIC BLOOD PRESSURE: 78 MMHG | HEART RATE: 80 BPM | SYSTOLIC BLOOD PRESSURE: 130 MMHG | HEIGHT: 64 IN | WEIGHT: 256.19 LBS | BODY MASS INDEX: 43.74 KG/M2

## 2021-11-29 DIAGNOSIS — E66.01 MORBID OBESITY DUE TO EXCESS CALORIES: ICD-10-CM

## 2021-11-29 DIAGNOSIS — E11.9 TYPE 2 DIABETES MELLITUS WITHOUT COMPLICATION, WITHOUT LONG-TERM CURRENT USE OF INSULIN: Primary | ICD-10-CM

## 2021-11-29 LAB — HBA1C MFR BLD: 5.9 %

## 2021-11-29 PROCEDURE — 99213 PR OFFICE/OUTPT VISIT, EST, LEVL III, 20-29 MIN: ICD-10-PCS | Mod: S$GLB,,, | Performed by: NURSE PRACTITIONER

## 2021-11-29 PROCEDURE — 99213 OFFICE O/P EST LOW 20 MIN: CPT | Mod: S$GLB,,, | Performed by: NURSE PRACTITIONER

## 2021-11-29 PROCEDURE — 83036 POCT HEMOGLOBIN A1C: ICD-10-PCS | Mod: QW,,, | Performed by: NURSE PRACTITIONER

## 2021-11-29 PROCEDURE — 83036 HEMOGLOBIN GLYCOSYLATED A1C: CPT | Mod: QW,,, | Performed by: NURSE PRACTITIONER

## 2022-02-25 ENCOUNTER — OFFICE VISIT (OUTPATIENT)
Dept: FAMILY MEDICINE | Facility: CLINIC | Age: 32
End: 2022-02-25
Payer: MEDICAID

## 2022-02-25 VITALS
BODY MASS INDEX: 45.58 KG/M2 | OXYGEN SATURATION: 99 % | DIASTOLIC BLOOD PRESSURE: 100 MMHG | WEIGHT: 267 LBS | HEIGHT: 64 IN | HEART RATE: 96 BPM | SYSTOLIC BLOOD PRESSURE: 150 MMHG

## 2022-02-25 DIAGNOSIS — N91.2 AMENORRHEA: Primary | ICD-10-CM

## 2022-02-25 DIAGNOSIS — E11.9 TYPE 2 DIABETES MELLITUS WITHOUT COMPLICATION, WITHOUT LONG-TERM CURRENT USE OF INSULIN: ICD-10-CM

## 2022-02-25 LAB
B-HCG UR QL: NEGATIVE
CTP QC/QA: YES

## 2022-02-25 PROCEDURE — 3080F DIAST BP >= 90 MM HG: CPT | Mod: S$GLB,,, | Performed by: NURSE PRACTITIONER

## 2022-02-25 PROCEDURE — 81025 URINE PREGNANCY TEST: CPT | Mod: S$GLB,,, | Performed by: NURSE PRACTITIONER

## 2022-02-25 PROCEDURE — 99213 PR OFFICE/OUTPT VISIT, EST, LEVL III, 20-29 MIN: ICD-10-PCS | Mod: S$GLB,,, | Performed by: NURSE PRACTITIONER

## 2022-02-25 PROCEDURE — 1160F PR REVIEW ALL MEDS BY PRESCRIBER/CLIN PHARMACIST DOCUMENTED: ICD-10-PCS | Mod: S$GLB,,, | Performed by: NURSE PRACTITIONER

## 2022-02-25 PROCEDURE — 3077F SYST BP >= 140 MM HG: CPT | Mod: S$GLB,,, | Performed by: NURSE PRACTITIONER

## 2022-02-25 PROCEDURE — 3008F PR BODY MASS INDEX (BMI) DOCUMENTED: ICD-10-PCS | Mod: S$GLB,,, | Performed by: NURSE PRACTITIONER

## 2022-02-25 PROCEDURE — 3077F PR MOST RECENT SYSTOLIC BLOOD PRESSURE >= 140 MM HG: ICD-10-PCS | Mod: S$GLB,,, | Performed by: NURSE PRACTITIONER

## 2022-02-25 PROCEDURE — 3080F PR MOST RECENT DIASTOLIC BLOOD PRESSURE >= 90 MM HG: ICD-10-PCS | Mod: S$GLB,,, | Performed by: NURSE PRACTITIONER

## 2022-02-25 PROCEDURE — 1160F RVW MEDS BY RX/DR IN RCRD: CPT | Mod: S$GLB,,, | Performed by: NURSE PRACTITIONER

## 2022-02-25 PROCEDURE — 81025 POCT URINE PREGNANCY: ICD-10-PCS | Mod: S$GLB,,, | Performed by: NURSE PRACTITIONER

## 2022-02-25 PROCEDURE — 99213 OFFICE O/P EST LOW 20 MIN: CPT | Mod: S$GLB,,, | Performed by: NURSE PRACTITIONER

## 2022-02-25 PROCEDURE — 3008F BODY MASS INDEX DOCD: CPT | Mod: S$GLB,,, | Performed by: NURSE PRACTITIONER

## 2022-02-25 NOTE — PROGRESS NOTES
SUBJECTIVE:    Patient ID: Perla Jean is a 31 y.o. female.    Chief Complaint: Breast Pain (No bottles//pt took pregnancy test which was negative, c/o breast and nipple soreness/ increased sensitivity-MJ)    Cpt presents with c/o lashonda breast tenderness and sensitivity of the nipples. Has been occurring for the last week. Pain is there all of the time. Took pregnancy test a week ago and was negative. When she takes her bra off, breasts feel heavy and painful. Did not have period last month or this month. Reports usually has regular cycles. Not on birth control. No sores or rashes on breasts. No fevers. Also reports intermittent nausea. Has been riding bike at gym. Denies any high impact exercises. Does not really feel her sports bras are supportive enough.       Office Visit on 02/25/2022   Component Date Value Ref Range Status    POC Preg Test, Ur 02/25/2022 Negative  Negative Final     Acceptable 02/25/2022 Yes   Final   Office Visit on 11/29/2021   Component Date Value Ref Range Status    Hemoglobin A1C 11/29/2021 5.9  % Final       Past Medical History:   Diagnosis Date    Diabetes mellitus, type 2     Epilepsy     in childhood     Past Surgical History:   Procedure Laterality Date    CHOLECYSTECTOMY       Family History   Problem Relation Age of Onset    Asthma Mother     Arthritis Mother     No Known Problems Father        Marital Status: Single  Alcohol History:  reports no history of alcohol use.  Tobacco History:  reports that she has never smoked. She has never used smokeless tobacco.  Drug History:  reports no history of drug use.    Review of patient's allergies indicates:  No Known Allergies    Current Outpatient Medications:     acetaminophen (TYLENOL) 500 MG tablet, Take 2 tablets (1,000 mg total) by mouth every 6 (six) hours as needed for Pain., Disp: 30 tablet, Rfl: 0    Review of Systems   Constitutional: Positive for unexpected weight change (has gained 10 lbs).  "Negative for activity change, fatigue and fever.   HENT: Negative.    Respiratory: Negative for chest tightness and shortness of breath.    Cardiovascular: Negative for chest pain and palpitations.   Gastrointestinal: Positive for nausea (intermittent nausea).   Genitourinary:        Amenorrhea, lashonda breast pain   Musculoskeletal: Negative.    Neurological: Negative.           Objective:      Vitals:    02/25/22 0758 02/25/22 0815   BP: (!) 146/80 (!) 150/100   Pulse: 96    SpO2: 99%    Weight: 121.1 kg (267 lb)    Height: 5' 4" (1.626 m)      Body mass index is 45.83 kg/m².  Physical Exam  Constitutional:       Appearance: Normal appearance. She is obese.   HENT:      Head: Normocephalic and atraumatic.      Mouth/Throat:      Mouth: Mucous membranes are moist.      Pharynx: Oropharynx is clear.   Cardiovascular:      Rate and Rhythm: Normal rate.   Pulmonary:      Effort: Pulmonary effort is normal. No respiratory distress.   Chest:   Breasts:      Right: Tenderness present. No swelling, nipple discharge or skin change.      Left: Tenderness present. No swelling, nipple discharge or skin change.       Abdominal:      General: There is no distension.      Tenderness: There is no abdominal tenderness.   Skin:     General: Skin is warm.   Neurological:      Mental Status: She is alert and oriented to person, place, and time.   Psychiatric:         Mood and Affect: Mood normal.           Assessment:       1. Amenorrhea    2. Type 2 diabetes mellitus without complication, without long-term current use of insulin         Plan:       Amenorrhea  -     POCT urine pregnancy  -     HCG, Quantitative; Future; Expected date: 02/25/2022    Type 2 diabetes mellitus without complication, without long-term current use of insulin  -     CBC Auto Differential; Future; Expected date: 02/25/2022  -     Comprehensive Metabolic Panel; Future; Expected date: 02/25/2022  -     TSH w/reflex to FT4; Future; Expected date: 02/25/2022  -     " Hemoglobin A1C; Future; Expected date: 02/25/2022    Urine pregnancy test negative. However, pt has missed two menstrual cycles and c/o breast tenderness and nausea. Will get quantitative HCG. Also discussed using barrier cream on nipple before exercise and using more supportive sports bras. If blood test is negative, consider referral to Gyn.    No follow-ups on file.

## 2022-02-26 LAB
ALBUMIN SERPL-MCNC: 3.7 G/DL (ref 3.6–5.1)
ALBUMIN/GLOB SERPL: 1.1 (CALC) (ref 1–2.5)
ALP SERPL-CCNC: 96 U/L (ref 31–125)
ALT SERPL-CCNC: 14 U/L (ref 6–29)
AST SERPL-CCNC: 14 U/L (ref 10–30)
B-HCG SERPL-ACNC: <3 MIU/ML
BASOPHILS # BLD AUTO: 41 CELLS/UL (ref 0–200)
BASOPHILS NFR BLD AUTO: 0.3 %
BILIRUB SERPL-MCNC: 0.7 MG/DL (ref 0.2–1.2)
BUN SERPL-MCNC: 10 MG/DL (ref 7–25)
BUN/CREAT SERPL: ABNORMAL (CALC) (ref 6–22)
CALCIUM SERPL-MCNC: 8.7 MG/DL (ref 8.6–10.2)
CHLORIDE SERPL-SCNC: 104 MMOL/L (ref 98–110)
CO2 SERPL-SCNC: 27 MMOL/L (ref 20–32)
CREAT SERPL-MCNC: 1.08 MG/DL (ref 0.5–1.1)
EOSINOPHIL # BLD AUTO: 68 CELLS/UL (ref 15–500)
EOSINOPHIL NFR BLD AUTO: 0.5 %
ERYTHROCYTE [DISTWIDTH] IN BLOOD BY AUTOMATED COUNT: 14.5 % (ref 11–15)
GLOBULIN SER CALC-MCNC: 3.3 G/DL (CALC) (ref 1.9–3.7)
GLUCOSE SERPL-MCNC: 116 MG/DL (ref 65–99)
HBA1C MFR BLD: 6.7 % OF TOTAL HGB
HCT VFR BLD AUTO: 38.5 % (ref 35–45)
HGB BLD-MCNC: 12.1 G/DL (ref 11.7–15.5)
LYMPHOCYTES # BLD AUTO: 3389 CELLS/UL (ref 850–3900)
LYMPHOCYTES NFR BLD AUTO: 25.1 %
MCH RBC QN AUTO: 25.5 PG (ref 27–33)
MCHC RBC AUTO-ENTMCNC: 31.4 G/DL (ref 32–36)
MCV RBC AUTO: 81.2 FL (ref 80–100)
MONOCYTES # BLD AUTO: 824 CELLS/UL (ref 200–950)
MONOCYTES NFR BLD AUTO: 6.1 %
NEUTROPHILS # BLD AUTO: 9180 CELLS/UL (ref 1500–7800)
NEUTROPHILS NFR BLD AUTO: 68 %
PLATELET # BLD AUTO: 212 THOUSAND/UL (ref 140–400)
PMV BLD REES-ECKER: 11.6 FL (ref 7.5–12.5)
POTASSIUM SERPL-SCNC: 4.1 MMOL/L (ref 3.5–5.3)
PROT SERPL-MCNC: 7 G/DL (ref 6.1–8.1)
RBC # BLD AUTO: 4.74 MILLION/UL (ref 3.8–5.1)
SODIUM SERPL-SCNC: 137 MMOL/L (ref 135–146)
TSH SERPL-ACNC: 2.17 MIU/L
WBC # BLD AUTO: 13.5 THOUSAND/UL (ref 3.8–10.8)

## 2022-02-28 ENCOUNTER — TELEPHONE (OUTPATIENT)
Dept: FAMILY MEDICINE | Facility: CLINIC | Age: 32
End: 2022-02-28
Payer: MEDICAID

## 2022-03-07 ENCOUNTER — TELEPHONE (OUTPATIENT)
Dept: FAMILY MEDICINE | Facility: CLINIC | Age: 32
End: 2022-03-07
Payer: MEDICAID

## 2022-03-07 DIAGNOSIS — D72.829 ELEVATED WBC COUNT: Primary | ICD-10-CM

## 2022-03-07 NOTE — TELEPHONE ENCOUNTER
----- Message from Jesica Dawn NP sent at 3/6/2022  8:21 PM CST -----  Set reminder to repeat CBC in one week and A1C in 2 months.     Your white blood cell counts are a bit elevated. Recommend repeat in a week to be sure they come back down. In addition, your diabetes number (A1C) is once again elevated. I know you were able to get it under control previously with diet and exercise. We will recheck in 2 months. If not improved, you will need to get back on Metformin. Your HCG was negative for pregnancy. If you still have not had a period, I strongly recommend getting in to see your OBGYN to discuss.   Written by Jesica Dawn NP on 3/6/2022  8:21 PM CST

## 2022-03-07 NOTE — TELEPHONE ENCOUNTER
Called patient, no voicemail.  Called ECKathrine, he will have Ms Perla call me back. Both remind me's created.

## 2022-03-08 NOTE — TELEPHONE ENCOUNTER
Spoke to patient with results verbatim per Jesica. Verbalized understanding on all, including CBC next week, Monday or Tuesday, and she does not have to fast, and A1C in 2 months. Remind me's created. CBC order pended. Also verbalized understanding to see OB/GYN if she has not gotten period.

## 2022-03-15 ENCOUNTER — TELEPHONE (OUTPATIENT)
Dept: FAMILY MEDICINE | Facility: CLINIC | Age: 32
End: 2022-03-15
Payer: MEDICAID

## 2022-03-15 NOTE — TELEPHONE ENCOUNTER
Spoke to patient that lab is due and she does not need to fast. Said she will come tomorrow. Updated remind me.

## 2022-03-15 NOTE — TELEPHONE ENCOUNTER
----- Message from RT Natalya sent at 3/7/2022 11:36 AM CST -----  Regarding: CBC done? Seperate remind me for A1C  Your white blood cell counts are a bit elevated. Recommend repeat in a week to be sure they come back down. In addition, your diabetes number (A1C) is once again elevated. I know you were able to get it under control previously with diet and exercise. We will recheck in 2 months. If not improved, you will need to get back on Metformin. Your HCG was negative for pregnancy. If you still have not had a period, I strongly recommend getting in to see your OBGYN to discuss.  Written by Jesica Dawn NP on 3/6/2022  8:21 PM CST

## 2022-03-22 ENCOUNTER — TELEPHONE (OUTPATIENT)
Dept: FAMILY MEDICINE | Facility: CLINIC | Age: 32
End: 2022-03-22
Payer: MEDICAID

## 2022-03-22 NOTE — TELEPHONE ENCOUNTER
Spoke to patient that lab is overdue as Jesica wanted this in a week. She said it is hard to get off work. Informed her that other [facility] is open on Saturday. She will go soon. Updated remind me.

## 2022-04-05 ENCOUNTER — TELEPHONE (OUTPATIENT)
Dept: FAMILY MEDICINE | Facility: CLINIC | Age: 32
End: 2022-04-05
Payer: MEDICAID

## 2022-04-05 NOTE — TELEPHONE ENCOUNTER
Left mess that lab is due and she does not need to fast. Informed her she doesn't need to fast and to try to have this drawn soon. This makes 3 attempts. Can I roma reminder complete?

## 2022-04-06 ENCOUNTER — TELEPHONE (OUTPATIENT)
Dept: FAMILY MEDICINE | Facility: CLINIC | Age: 32
End: 2022-04-06
Payer: MEDICAID

## 2022-05-03 ENCOUNTER — TELEPHONE (OUTPATIENT)
Dept: FAMILY MEDICINE | Facility: CLINIC | Age: 32
End: 2022-05-03

## 2022-05-03 DIAGNOSIS — E11.9 TYPE 2 DIABETES MELLITUS WITHOUT COMPLICATION, WITHOUT LONG-TERM CURRENT USE OF INSULIN: Primary | ICD-10-CM

## 2022-05-03 NOTE — TELEPHONE ENCOUNTER
Called patient, no answer, no voicemail. Left mess for WENDIE Chisholm, to have patient call me. Updated reminder. Order sent to [facility] under [facility]. Jesica no longer here, doesn't have upcoming appt with anyone and saw Shreya in November. In looking further, Jesica gave permission on 4/5/22 to sign this remind me. 3 attempts were made. Today makes 5 attempts.

## 2022-05-03 NOTE — TELEPHONE ENCOUNTER
----- Message from RT Natalya sent at 3/7/2022 11:36 AM CST -----  Regarding: A1C ab due  Your white blood cell counts are a bit elevated. Recommend repeat in a week to be sure they come back down. In addition, your diabetes number (A1C) is once again elevated. I know you were able to get it under control previously with diet and exercise. We will recheck in 2 months. If not improved, you will need to get back on Metformin. Your HCG was negative for pregnancy. If you still have not had a period, I strongly recommend getting in to see your OBGYN to discuss.  Written by Jesica Dawn NP on 3/6/2022  8:21 PM CST

## 2022-09-20 ENCOUNTER — OFFICE VISIT (OUTPATIENT)
Dept: FAMILY MEDICINE | Facility: CLINIC | Age: 32
End: 2022-09-20
Payer: MEDICAID

## 2022-09-20 VITALS
WEIGHT: 267 LBS | DIASTOLIC BLOOD PRESSURE: 82 MMHG | SYSTOLIC BLOOD PRESSURE: 134 MMHG | OXYGEN SATURATION: 98 % | BODY MASS INDEX: 45.58 KG/M2 | HEIGHT: 64 IN | HEART RATE: 100 BPM

## 2022-09-20 DIAGNOSIS — E66.01 MORBID OBESITY DUE TO EXCESS CALORIES: ICD-10-CM

## 2022-09-20 DIAGNOSIS — E11.9 TYPE 2 DIABETES MELLITUS WITHOUT COMPLICATION, WITHOUT LONG-TERM CURRENT USE OF INSULIN: Primary | ICD-10-CM

## 2022-09-20 LAB — HBA1C MFR BLD: 6.4 %

## 2022-09-20 PROCEDURE — 1160F RVW MEDS BY RX/DR IN RCRD: CPT | Mod: CPTII,S$GLB,, | Performed by: NURSE PRACTITIONER

## 2022-09-20 PROCEDURE — 1160F PR REVIEW ALL MEDS BY PRESCRIBER/CLIN PHARMACIST DOCUMENTED: ICD-10-PCS | Mod: CPTII,S$GLB,, | Performed by: NURSE PRACTITIONER

## 2022-09-20 PROCEDURE — 1159F MED LIST DOCD IN RCRD: CPT | Mod: CPTII,S$GLB,, | Performed by: NURSE PRACTITIONER

## 2022-09-20 PROCEDURE — 3079F PR MOST RECENT DIASTOLIC BLOOD PRESSURE 80-89 MM HG: ICD-10-PCS | Mod: CPTII,S$GLB,, | Performed by: NURSE PRACTITIONER

## 2022-09-20 PROCEDURE — 83036 HEMOGLOBIN GLYCOSYLATED A1C: CPT | Mod: QW,,, | Performed by: NURSE PRACTITIONER

## 2022-09-20 PROCEDURE — 99214 OFFICE O/P EST MOD 30 MIN: CPT | Mod: S$GLB,,, | Performed by: NURSE PRACTITIONER

## 2022-09-20 PROCEDURE — 3075F PR MOST RECENT SYSTOLIC BLOOD PRESS GE 130-139MM HG: ICD-10-PCS | Mod: CPTII,S$GLB,, | Performed by: NURSE PRACTITIONER

## 2022-09-20 PROCEDURE — 3075F SYST BP GE 130 - 139MM HG: CPT | Mod: CPTII,S$GLB,, | Performed by: NURSE PRACTITIONER

## 2022-09-20 PROCEDURE — 3008F BODY MASS INDEX DOCD: CPT | Mod: CPTII,S$GLB,, | Performed by: NURSE PRACTITIONER

## 2022-09-20 PROCEDURE — 3044F HG A1C LEVEL LT 7.0%: CPT | Mod: CPTII,S$GLB,, | Performed by: NURSE PRACTITIONER

## 2022-09-20 PROCEDURE — 99214 PR OFFICE/OUTPT VISIT, EST, LEVL IV, 30-39 MIN: ICD-10-PCS | Mod: S$GLB,,, | Performed by: NURSE PRACTITIONER

## 2022-09-20 PROCEDURE — 3008F PR BODY MASS INDEX (BMI) DOCUMENTED: ICD-10-PCS | Mod: CPTII,S$GLB,, | Performed by: NURSE PRACTITIONER

## 2022-09-20 PROCEDURE — 3044F PR MOST RECENT HEMOGLOBIN A1C LEVEL <7.0%: ICD-10-PCS | Mod: CPTII,S$GLB,, | Performed by: NURSE PRACTITIONER

## 2022-09-20 PROCEDURE — 3079F DIAST BP 80-89 MM HG: CPT | Mod: CPTII,S$GLB,, | Performed by: NURSE PRACTITIONER

## 2022-09-20 PROCEDURE — 83036 POCT HEMOGLOBIN A1C: ICD-10-PCS | Mod: QW,,, | Performed by: NURSE PRACTITIONER

## 2022-09-20 PROCEDURE — 1159F PR MEDICATION LIST DOCUMENTED IN MEDICAL RECORD: ICD-10-PCS | Mod: CPTII,S$GLB,, | Performed by: NURSE PRACTITIONER

## 2022-09-20 RX ORDER — METFORMIN HYDROCHLORIDE 500 MG/1
500 TABLET, EXTENDED RELEASE ORAL
Qty: 90 TABLET | Refills: 1 | Status: SHIPPED | OUTPATIENT
Start: 2022-09-20

## 2022-09-20 RX ORDER — SEMAGLUTIDE 1.34 MG/ML
INJECTION, SOLUTION SUBCUTANEOUS
Qty: 1 PEN | Refills: 5 | Status: ON HOLD | OUTPATIENT
Start: 2022-09-20 | End: 2023-10-06

## 2022-09-20 NOTE — PROGRESS NOTES
SUBJECTIVE:    Patient ID: Perla Jean is a 32 y.o. female.    Chief Complaint: Diabetes (No bottles//Pt is here for a check up//ref for eye exam//Pt would like a ful vaccine//decline PNA vaccine//Ana )    Pt here for DM f/u- last saw Jesica DOUGLAS in February.  Reports currently not taking anything for DM- was previously prescribed metformin but did not want to take it and wanted to try to work on diet. Admits she hasn't really been following a diabetic diet however  No complaints today  UTD with pap last year with Dr. Sanchez      No visits with results within 6 Month(s) from this visit.   Latest known visit with results is:   Office Visit on 02/25/2022   Component Date Value Ref Range Status    POC Preg Test, Ur 02/25/2022 Negative  Negative Final     Acceptable 02/25/2022 Yes   Final    WBC 02/25/2022 13.5 (H)  3.8 - 10.8 Thousand/uL Final    RBC 02/25/2022 4.74  3.80 - 5.10 Million/uL Final    Hemoglobin 02/25/2022 12.1  11.7 - 15.5 g/dL Final    Hematocrit 02/25/2022 38.5  35.0 - 45.0 % Final    MCV 02/25/2022 81.2  80.0 - 100.0 fL Final    MCH 02/25/2022 25.5 (L)  27.0 - 33.0 pg Final    MCHC 02/25/2022 31.4 (L)  32.0 - 36.0 g/dL Final    RDW 02/25/2022 14.5  11.0 - 15.0 % Final    Platelets 02/25/2022 212  140 - 400 Thousand/uL Final    MPV 02/25/2022 11.6  7.5 - 12.5 fL Final    Neutrophils, Abs 02/25/2022 9,180 (H)  1,500 - 7,800 cells/uL Final    Lymph # 02/25/2022 3,389  850 - 3,900 cells/uL Final    Mono # 02/25/2022 824  200 - 950 cells/uL Final    Eos # 02/25/2022 68  15 - 500 cells/uL Final    Baso # 02/25/2022 41  0 - 200 cells/uL Final    Neutrophils Relative 02/25/2022 68  % Final    Lymph % 02/25/2022 25.1  % Final    Mono % 02/25/2022 6.1  % Final    Eosinophil % 02/25/2022 0.5  % Final    Basophil % 02/25/2022 0.3  % Final    Glucose 02/25/2022 116 (H)  65 - 99 mg/dL Final    BUN 02/25/2022 10  7 - 25 mg/dL Final    Creatinine 02/25/2022 1.08  0.50 - 1.10 mg/dL Final    eGFR if  non  02/25/2022 68  > OR = 60 mL/min/1.73m2 Final    eGFR if  02/25/2022 79  > OR = 60 mL/min/1.73m2 Final    BUN/Creatinine Ratio 02/25/2022 NOT APPLICABLE  6 - 22 (calc) Final    Sodium 02/25/2022 137  135 - 146 mmol/L Final    Potassium 02/25/2022 4.1  3.5 - 5.3 mmol/L Final    Chloride 02/25/2022 104  98 - 110 mmol/L Final    CO2 02/25/2022 27  20 - 32 mmol/L Final    Calcium 02/25/2022 8.7  8.6 - 10.2 mg/dL Final    Total Protein 02/25/2022 7.0  6.1 - 8.1 g/dL Final    Albumin 02/25/2022 3.7  3.6 - 5.1 g/dL Final    Globulin, Total 02/25/2022 3.3  1.9 - 3.7 g/dL (calc) Final    Albumin/Globulin Ratio 02/25/2022 1.1  1.0 - 2.5 (calc) Final    Total Bilirubin 02/25/2022 0.7  0.2 - 1.2 mg/dL Final    Alkaline Phosphatase 02/25/2022 96  31 - 125 U/L Final    AST 02/25/2022 14  10 - 30 U/L Final    ALT 02/25/2022 14  6 - 29 U/L Final    TSH w/reflex to FT4 02/25/2022 2.17  mIU/L Final    Hemoglobin A1C 02/25/2022 6.7 (H)  <5.7 % of total Hgb Final    HCG, Quantitative 02/25/2022 <3  mIU/mL Final       Past Medical History:   Diagnosis Date    Diabetes mellitus, type 2     Epilepsy     in childhood     Past Surgical History:   Procedure Laterality Date    CHOLECYSTECTOMY       Family History   Problem Relation Age of Onset    Asthma Mother     Arthritis Mother     No Known Problems Father        The 10-year CVD risk score (GILBERT'Agoino, et al., 2008) is: 3.8%    Values used to calculate the score:      Age: 32 years      Sex: Female      Diabetic: Yes      Tobacco smoker: No      Systolic Blood Pressure: 134 mmHg      Is BP treated: No      HDL Cholesterol: 38 mg/dL      Total Cholesterol: 168 mg/dL     Marital Status: Single  Alcohol History:  reports no history of alcohol use.  Tobacco History:  reports that she has never smoked. She has never used smokeless tobacco.  Drug History:  reports no history of drug use.    Health Maintenance Topics with due status: Not Due       Topic Last  Completion Date    Foot Exam 09/20/2022     Immunization History   Administered Date(s) Administered    COVID-19, MRNA, LN-S, PF (MODERNA FULL 0.5 ML DOSE) 05/04/2021, 06/01/2021    HPV Quadrivalent 01/24/2008    Hepatitis B 05/10/1999, 05/29/2001    Hepatitis B, Pediatric/Adolescent 11/02/2007, 01/24/2008    Influenza - Quadrivalent 12/18/2015    MMR 11/02/2007    Meningococcal Conjugate (MCV4P) 11/02/2007    PPD Test 05/29/2001    Tdap 11/02/2007    Varicella 01/24/2008       Review of patient's allergies indicates:  No Known Allergies    Current Outpatient Medications:     acetaminophen (TYLENOL) 500 MG tablet, Take 2 tablets (1,000 mg total) by mouth every 6 (six) hours as needed for Pain., Disp: 30 tablet, Rfl: 0    metFORMIN (GLUCOPHAGE-XR) 500 MG ER 24hr tablet, Take 1 tablet (500 mg total) by mouth daily with breakfast., Disp: 90 tablet, Rfl: 1    semaglutide (OZEMPIC) 0.25 mg or 0.5 mg(2 mg/1.5 mL) pen injector, Start 0.25mg weekly injection for 4 weeks then increase to 0.5mg weekly if tolerating, Disp: 1 pen, Rfl: 5    Review of Systems   Constitutional:  Negative for appetite change, chills, fever and unexpected weight change.   HENT:  Negative for sore throat and trouble swallowing.    Eyes:  Negative for visual disturbance.   Respiratory:  Negative for cough, shortness of breath and wheezing.    Cardiovascular:  Negative for chest pain, palpitations and leg swelling.   Gastrointestinal:  Negative for abdominal pain, constipation and diarrhea.   Endocrine: Negative for polydipsia, polyphagia and polyuria.   Genitourinary:  Negative for dysuria and hematuria.   Musculoskeletal:  Negative for back pain.   Skin:  Negative for rash.   Neurological:  Negative for dizziness, seizures (no seizures since childhood), syncope, numbness and headaches.   Psychiatric/Behavioral:  Negative for dysphoric mood. The patient is not nervous/anxious.         Objective:      Vitals:    09/20/22 1521   BP: 134/82   Pulse: 100  "  SpO2: 98%   Weight: 121.1 kg (267 lb)   Height: 5' 4" (1.626 m)     Physical Exam  Vitals and nursing note reviewed.   Constitutional:       General: She is not in acute distress.     Appearance: She is well-developed. She is obese.   HENT:      Head: Normocephalic and atraumatic.      Right Ear: Tympanic membrane and ear canal normal.      Left Ear: Tympanic membrane and ear canal normal.   Neck:      Vascular: No carotid bruit.   Cardiovascular:      Rate and Rhythm: Normal rate and regular rhythm.      Pulses:           Dorsalis pedis pulses are 2+ on the right side and 2+ on the left side.      Heart sounds: No murmur heard.    No friction rub. No gallop.   Pulmonary:      Effort: Pulmonary effort is normal. No respiratory distress.      Breath sounds: Normal breath sounds. No wheezing or rales.   Abdominal:      General: There is no distension.      Palpations: Abdomen is soft.      Tenderness: There is no abdominal tenderness.   Musculoskeletal:      Cervical back: Neck supple.      Right lower leg: No edema.      Left lower leg: No edema.      Right foot: No deformity.      Left foot: No deformity.   Feet:      Right foot:      Protective Sensation: 5 sites tested.  5 sites sensed.      Skin integrity: No ulcer, erythema or callus.      Toenail Condition: Right toenails are normal.      Left foot:      Protective Sensation: 5 sites tested.  5 sites sensed.      Skin integrity: No ulcer, erythema or callus.      Toenail Condition: Left toenails are normal.   Lymphadenopathy:      Cervical: No cervical adenopathy.   Skin:     General: Skin is warm and dry.      Findings: No rash.   Neurological:      General: No focal deficit present.      Mental Status: She is alert and oriented to person, place, and time.      Gait: Gait normal.   Psychiatric:         Mood and Affect: Mood normal.         Assessment:       1. Type 2 diabetes mellitus without complication, without long-term current use of insulin    2. " Morbid obesity due to excess calories           Plan:       Type 2 diabetes mellitus without complication, without long-term current use of insulin  Comments:  A1c today 6.4%, discussed importance of better blood sugar control as well as weight loss.  Resume metformin and will try Ozempic if covered by insurance  Orders:  -     Ambulatory referral/consult to Optometry; Future; Expected date: 09/27/2022  -      DIABETES FOOT EXAM  -     Hemoglobin A1C, POCT  -     metFORMIN (GLUCOPHAGE-XR) 500 MG ER 24hr tablet; Take 1 tablet (500 mg total) by mouth daily with breakfast.  Dispense: 90 tablet; Refill: 1  -     CBC Auto Differential; Future; Expected date: 09/20/2022  -     Comprehensive Metabolic Panel; Future; Expected date: 09/20/2022  -     Lipid Panel; Future; Expected date: 09/20/2022  -     TSH w/reflex to FT4; Future; Expected date: 09/20/2022  -     Urinalysis, Reflex to Urine Culture Urine, Clean Catch; Future; Expected date: 09/20/2022  -     Microalbumin/Creatinine Ratio, Urine; Future; Expected date: 09/20/2022  -     Hemoglobin A1C; Future; Expected date: 09/20/2022  -     semaglutide (OZEMPIC) 0.25 mg or 0.5 mg(2 mg/1.5 mL) pen injector; Start 0.25mg weekly injection for 4 weeks then increase to 0.5mg weekly if tolerating  Dispense: 1 pen; Refill: 5    Morbid obesity due to excess calories  Comments:  Discussed healthy diet and exercises changes and encouraged weight loss    Follow up in about 3 months (around 12/20/2022) for Labs before next visit, Diabetes.          Counseled on age and gender appropriate medical preventative services, including cancer screenings, immunizations, overall nutritional health, need for a consistent exercise regimen and an overall push towards maintaining a vigorous and active lifestyle.      9/20/2022 Shreya Munoz NP

## 2022-12-05 LAB
LEFT EYE DM RETINOPATHY: NEGATIVE
RIGHT EYE DM RETINOPATHY: NEGATIVE

## 2022-12-27 ENCOUNTER — PATIENT MESSAGE (OUTPATIENT)
Dept: FAMILY MEDICINE | Facility: CLINIC | Age: 32
End: 2022-12-27

## 2022-12-27 ENCOUNTER — TELEPHONE (OUTPATIENT)
Dept: FAMILY MEDICINE | Facility: CLINIC | Age: 32
End: 2022-12-27

## 2022-12-27 ENCOUNTER — TELEPHONE (OUTPATIENT)
Dept: OBSTETRICS AND GYNECOLOGY | Facility: CLINIC | Age: 32
End: 2022-12-27
Payer: MEDICAID

## 2022-12-27 DIAGNOSIS — Z30.9 ENCOUNTER FOR CONTRACEPTIVE MANAGEMENT, UNSPECIFIED TYPE: Primary | ICD-10-CM

## 2022-12-27 NOTE — TELEPHONE ENCOUNTER
----- Message from Oliverio Sam sent at 12/27/2022  8:46 AM CST -----  Contact: pt  Type:  Sooner Appointment Request    Caller is requesting a sooner appointment.  Caller declined first available appointment listed below.  Caller will not accept being placed on the waitlist and is requesting a message be sent to doctor.    Name of Caller:  pt   When is the first available appointment? 02/2023  Symptoms:  well women visit  Best Call Back Number:  [phone]    Additional Information:  new pt would like a sooner appt. Please advise.

## 2022-12-27 NOTE — TELEPHONE ENCOUNTER
Pt advised that the soonest available appt at this time is 2/14/23.  Pt voiced understanding and scheduled her WWE.

## 2023-01-05 ENCOUNTER — TELEPHONE (OUTPATIENT)
Dept: FAMILY MEDICINE | Facility: CLINIC | Age: 33
End: 2023-01-05

## 2023-02-02 ENCOUNTER — TELEPHONE (OUTPATIENT)
Dept: FAMILY MEDICINE | Facility: CLINIC | Age: 33
End: 2023-02-02

## 2023-02-02 ENCOUNTER — PATIENT MESSAGE (OUTPATIENT)
Dept: FAMILY MEDICINE | Facility: CLINIC | Age: 33
End: 2023-02-02

## 2023-02-14 ENCOUNTER — LAB VISIT (OUTPATIENT)
Dept: LAB | Facility: HOSPITAL | Age: 33
End: 2023-02-14
Attending: STUDENT IN AN ORGANIZED HEALTH CARE EDUCATION/TRAINING PROGRAM
Payer: MEDICAID

## 2023-02-14 ENCOUNTER — PATIENT MESSAGE (OUTPATIENT)
Dept: OBSTETRICS AND GYNECOLOGY | Facility: CLINIC | Age: 33
End: 2023-02-14

## 2023-02-14 ENCOUNTER — OFFICE VISIT (OUTPATIENT)
Dept: OBSTETRICS AND GYNECOLOGY | Facility: CLINIC | Age: 33
End: 2023-02-14
Payer: MEDICAID

## 2023-02-14 VITALS
HEIGHT: 64 IN | BODY MASS INDEX: 43.66 KG/M2 | WEIGHT: 255.75 LBS | DIASTOLIC BLOOD PRESSURE: 74 MMHG | HEART RATE: 107 BPM | SYSTOLIC BLOOD PRESSURE: 131 MMHG

## 2023-02-14 DIAGNOSIS — Z71.85 HPV VACCINE COUNSELING: ICD-10-CM

## 2023-02-14 DIAGNOSIS — N91.2 ABSENT MENSES: Primary | ICD-10-CM

## 2023-02-14 DIAGNOSIS — N91.2 ABSENT MENSES: ICD-10-CM

## 2023-02-14 DIAGNOSIS — R11.2 NAUSEA AND VOMITING, UNSPECIFIED VOMITING TYPE: ICD-10-CM

## 2023-02-14 DIAGNOSIS — Z12.4 SCREENING FOR MALIGNANT NEOPLASM OF CERVIX: ICD-10-CM

## 2023-02-14 LAB — HCG INTACT+B SERPL-ACNC: 437 MIU/ML

## 2023-02-14 PROCEDURE — 1159F MED LIST DOCD IN RCRD: CPT | Mod: CPTII,,, | Performed by: STUDENT IN AN ORGANIZED HEALTH CARE EDUCATION/TRAINING PROGRAM

## 2023-02-14 PROCEDURE — 99999 PR PBB SHADOW E&M-EST. PATIENT-LVL III: CPT | Mod: PBBFAC,,, | Performed by: STUDENT IN AN ORGANIZED HEALTH CARE EDUCATION/TRAINING PROGRAM

## 2023-02-14 PROCEDURE — 3075F SYST BP GE 130 - 139MM HG: CPT | Mod: CPTII,,, | Performed by: STUDENT IN AN ORGANIZED HEALTH CARE EDUCATION/TRAINING PROGRAM

## 2023-02-14 PROCEDURE — 87624 HPV HI-RISK TYP POOLED RSLT: CPT | Performed by: STUDENT IN AN ORGANIZED HEALTH CARE EDUCATION/TRAINING PROGRAM

## 2023-02-14 PROCEDURE — 99385 PR PREVENTIVE VISIT,NEW,18-39: ICD-10-PCS | Mod: S$PBB,,, | Performed by: STUDENT IN AN ORGANIZED HEALTH CARE EDUCATION/TRAINING PROGRAM

## 2023-02-14 PROCEDURE — 3078F DIAST BP <80 MM HG: CPT | Mod: CPTII,,, | Performed by: STUDENT IN AN ORGANIZED HEALTH CARE EDUCATION/TRAINING PROGRAM

## 2023-02-14 PROCEDURE — 3078F PR MOST RECENT DIASTOLIC BLOOD PRESSURE < 80 MM HG: ICD-10-PCS | Mod: CPTII,,, | Performed by: STUDENT IN AN ORGANIZED HEALTH CARE EDUCATION/TRAINING PROGRAM

## 2023-02-14 PROCEDURE — 1159F PR MEDICATION LIST DOCUMENTED IN MEDICAL RECORD: ICD-10-PCS | Mod: CPTII,,, | Performed by: STUDENT IN AN ORGANIZED HEALTH CARE EDUCATION/TRAINING PROGRAM

## 2023-02-14 PROCEDURE — 88175 CYTOPATH C/V AUTO FLUID REDO: CPT | Performed by: STUDENT IN AN ORGANIZED HEALTH CARE EDUCATION/TRAINING PROGRAM

## 2023-02-14 PROCEDURE — 36415 COLL VENOUS BLD VENIPUNCTURE: CPT | Performed by: STUDENT IN AN ORGANIZED HEALTH CARE EDUCATION/TRAINING PROGRAM

## 2023-02-14 PROCEDURE — 3075F PR MOST RECENT SYSTOLIC BLOOD PRESS GE 130-139MM HG: ICD-10-PCS | Mod: CPTII,,, | Performed by: STUDENT IN AN ORGANIZED HEALTH CARE EDUCATION/TRAINING PROGRAM

## 2023-02-14 PROCEDURE — 84702 CHORIONIC GONADOTROPIN TEST: CPT | Performed by: STUDENT IN AN ORGANIZED HEALTH CARE EDUCATION/TRAINING PROGRAM

## 2023-02-14 PROCEDURE — 1160F PR REVIEW ALL MEDS BY PRESCRIBER/CLIN PHARMACIST DOCUMENTED: ICD-10-PCS | Mod: CPTII,,, | Performed by: STUDENT IN AN ORGANIZED HEALTH CARE EDUCATION/TRAINING PROGRAM

## 2023-02-14 PROCEDURE — 3008F PR BODY MASS INDEX (BMI) DOCUMENTED: ICD-10-PCS | Mod: CPTII,,, | Performed by: STUDENT IN AN ORGANIZED HEALTH CARE EDUCATION/TRAINING PROGRAM

## 2023-02-14 PROCEDURE — 3008F BODY MASS INDEX DOCD: CPT | Mod: CPTII,,, | Performed by: STUDENT IN AN ORGANIZED HEALTH CARE EDUCATION/TRAINING PROGRAM

## 2023-02-14 PROCEDURE — 99999 PR PBB SHADOW E&M-EST. PATIENT-LVL III: ICD-10-PCS | Mod: PBBFAC,,, | Performed by: STUDENT IN AN ORGANIZED HEALTH CARE EDUCATION/TRAINING PROGRAM

## 2023-02-14 PROCEDURE — 1160F RVW MEDS BY RX/DR IN RCRD: CPT | Mod: CPTII,,, | Performed by: STUDENT IN AN ORGANIZED HEALTH CARE EDUCATION/TRAINING PROGRAM

## 2023-02-14 PROCEDURE — 99213 OFFICE O/P EST LOW 20 MIN: CPT | Mod: PBBFAC,PO | Performed by: STUDENT IN AN ORGANIZED HEALTH CARE EDUCATION/TRAINING PROGRAM

## 2023-02-14 PROCEDURE — 99385 PREV VISIT NEW AGE 18-39: CPT | Mod: S$PBB,,, | Performed by: STUDENT IN AN ORGANIZED HEALTH CARE EDUCATION/TRAINING PROGRAM

## 2023-02-14 RX ORDER — PYRIDOXINE HCL (VITAMIN B6) 100 MG
100 TABLET ORAL DAILY
Qty: 90 TABLET | Refills: 0 | Status: ON HOLD | OUTPATIENT
Start: 2023-02-14 | End: 2023-10-06

## 2023-02-14 NOTE — PROGRESS NOTES
[facility]    Subjective:   Chief Complaint:    Chief Complaint   Patient presents with    Annual Exam       Patient's last menstrual period was 2023 (exact date).  Contraception: None.  HRT: None.    Perla Jean is a 32 y.o.  who presents for an annual exam.  She participates in regular exercise: walking.  She does not smoke.  She wears seatbelts.  She is not taking a prenatal, multivitamin, vitamin D, and calcium.  She denies any domestic violence.    She thinks she might be pregnant as she was expecting her cycle to come around . She notes nausea,vomiting and dizziness for the past few days. She took a home pregnancy test at the end of January which was negative. She would like to get a blood pregnancy test today. She found out she was pregnant with her first child about 5 months into the pregnancy as the patient did not have any pregnancy symptoms.       Last Pap: years ago. Denies any history of abnormal pap smears.  Last mammogram: N/A.     FH:  Breast cancer: none.  Colon cancer: none.  Endometrial cancer: none.  Ovarian cancer: none.      OB History    Para Term  AB Living   1 1   1   1   SAB IAB Ectopic Multiple Live Births           1      # Outcome Date GA Lbr Salvatore/2nd Weight Sex Delivery Anes PTL Lv   1  11 [id number]   F Vag-Spont   BRAYAN       Past Medical History:   Diagnosis Date    Diabetes mellitus, type 2     Epilepsy     in childhood     Past Surgical History:   Procedure Laterality Date    CHOLECYSTECTOMY       Review of patient's allergies indicates:  No Known Allergies    Social History     Socioeconomic History    Marital status: Single   Tobacco Use    Smoking status: Never    Smokeless tobacco: Never   Substance and Sexual Activity    Alcohol use: No    Drug use: Never    Sexual activity: Yes     Partners: Male       Family History   Problem Relation Age of Onset    Asthma Mother     Arthritis Mother     No Known Problems  "Father        Medications:  Current Outpatient Medications on File Prior to Visit   Medication Sig Dispense Refill Last Dose    acetaminophen (TYLENOL) 500 MG tablet Take 2 tablets (1,000 mg total) by mouth every 6 (six) hours as needed for Pain. 30 tablet 0 Taking    metFORMIN (GLUCOPHAGE-XR) 500 MG ER 24hr tablet Take 1 tablet (500 mg total) by mouth daily with breakfast. 90 tablet 1 Taking    semaglutide (OZEMPIC) 0.25 mg or 0.5 mg(2 mg/1.5 mL) pen injector Start 0.25mg weekly injection for 4 weeks then increase to 0.5mg weekly if tolerating 1 pen 5 Taking       Review of Systems   Constitutional: Negative for appetite change, fever and unexpected weight change.   ENT: Negative for ear pain, tinnitus, sinus congestion or trouble swallowing.   Respiratory: Negative for cough and shortness of breath.    Cardiovascular: Negative for chest pain and palpitations.   Gastrointestinal: Negative for abdominal distention, constipation, nausea and vomiting.   Genitourinary: Negative for dyspareunia, dysuria, hematuria and pelvic pain.        GYN ROS per HPI.   Musculoskeletal: Negative for gait problem and myalgias.   Skin: Negative for rash.   Neurological: Negative for dizziness, light-headedness and headaches.   Psychiatric/Behavioral: The patient is not nervous/anxious.      Objective:   /74 (BP Location: Left arm, Patient Position: Sitting, BP Method: Large (Automatic))   Pulse 107   Ht 5' 4" (1.626 m)   Wt 116 kg (255 lb 11.7 oz)   LMP 01/17/2023 (Exact Date)   BMI 43.90 kg/m²     Physical Exam  Vitals reviewed. Exam conducted with a chaperone present.   HENT:      Head: Normocephalic and atraumatic.   Cardiovascular:      Rate and Rhythm: Normal rate and regular rhythm.   Pulmonary:      Effort: Pulmonary effort is normal. No accessory muscle usage or respiratory distress.   Chest:      Chest wall: No tenderness.   Breasts:     Breasts are symmetrical.      Right: No inverted nipple, mass, nipple " discharge, skin change or tenderness.      Left: No inverted nipple, mass, nipple discharge, skin change or tenderness.   Abdominal:      General: Abdomen is flat.      Tenderness: There is no abdominal tenderness. There is no guarding.   Genitourinary:     General: Normal vulva.      Pubic Area: No rash.       Labia:         Right: No rash or tenderness.         Left: No rash or tenderness.       Urethra: No prolapse.      Vagina: Normal. No tenderness.      Cervix: No cervical motion tenderness, erythema or cervical bleeding.      Uterus: Not tender.       Adnexa:         Right: No mass or tenderness.          Left: No mass or tenderness.        Comments: Exam limited due to body habitus.  Musculoskeletal:      Right lower leg: No edema.      Left lower leg: No edema.   Lymphadenopathy:      Upper Body:      Right upper body: No axillary adenopathy.      Left upper body: No axillary adenopathy.   Neurological:      General: No focal deficit present.      Mental Status: She is alert. Mental status is at baseline.        Assessment:     1. Absent menses    2. HPV vaccine counseling    3. Screening for malignant neoplasm of cervix    4. Nausea and vomiting, unspecified vomiting type      Plan:     32 y.o. female presents today for annual pap smear and exam.     1. Absent menses  - HCG, QUANTITATIVE, PREGNANCY; Future  - prenatal no118-iron-folic acid 29 mg iron- 1 mg Chew; Take 1 tablet by mouth once daily.  Dispense: 30 tablet; Refill: 3    2. HPV vaccine counseling    3. Screening for malignant neoplasm of cervix  - Liquid-Based Pap Smear, Screening  - HPV High Risk Genotypes, PCR    4. Nausea and vomiting, unspecified vomiting type  - pyridoxine, vitamin B6, (B-6) 100 MG Tab; Take 1 tablet (100 mg total) by mouth once daily.  Dispense: 90 tablet; Refill: 0      Follow up for any GYN issues.    The above was reviewed and discussed with the patient.    Annual exam and screening issues based on the patient's age and  family history were discussed.     Physical exam was normal. I offered at UPT, but she declined. She would rather have a blood pregnancy test. If she is pregnant, informed that patient that she would need to be referred out since we do not have obstetric services at this time. Recommended drinking plenty of water to prevent dehydration.      - Pap and HPV testing performed.   - Mammogram N/A.  - Colonoscopy N/A.  - Tobacco cessation N/A.  - DEXA N/A.  - Counseled to take daily prenatal vitamin. If patient is of reproductive age and not on contraception, to take   prenatal vitamin.     The patient was provided with GARDASIL 9 vaccine information.  The pros, cons, risks, benefits, alternatives and indications of the GARDASIL vaccine were discussed.  We discussed the 0, 2 months, and 6 months dosing.     She declines the GARDASIL / Human papilloma virus vaccine.       [facility].   Vitamin B6 and prenatal sent to pharmacy.     The patient's questions were answered, and she agrees with the current plan.     The patient was counseled today on the new ACS guidelines for cervical cytology screening as well as the current recommendations for breast cancer screening. She was counseled to follow up with her PCP for other routine health maintenance.       Cathy Jerez PA-C  02/14/2023

## 2023-02-17 LAB
HPV HR 12 DNA SPEC QL NAA+PROBE: NEGATIVE
HPV16 AG SPEC QL: NEGATIVE
HPV18 DNA SPEC QL NAA+PROBE: NEGATIVE

## 2023-02-20 LAB
FINAL PATHOLOGIC DIAGNOSIS: NORMAL
Lab: NORMAL

## 2023-02-28 LAB
ANTIBODY SCREEN: NEGATIVE
C TRACH RRNA SPEC QL PROBE: NEGATIVE
GONORRHEA: NEGATIVE
HBV SURFACE AG SERPL QL IA: NEGATIVE
HIV 1+2 AB+HIV1 P24 AG SERPL QL IA: NEGATIVE
RPR: NORMAL
RUBELLA IMMUNE STATUS: NORMAL

## 2023-03-09 ENCOUNTER — NUTRITION (OUTPATIENT)
Dept: NUTRITION | Facility: HOSPITAL | Age: 33
End: 2023-03-09
Payer: MEDICAID

## 2023-03-09 DIAGNOSIS — E11.9 DIABETES MELLITUS WITHOUT COMPLICATION: Primary | ICD-10-CM

## 2023-03-09 PROCEDURE — G0108 DIAB MANAGE TRN  PER INDIV: HCPCS

## 2023-03-09 NOTE — PROGRESS NOTES
Diagnosis/Reason for Referral: Diabetes    Medical Nutrition Prescription: Diabetes Self Management Training        Weight History:  Wt Readings from Last 5 Encounters:   02/14/23 116 kg (255 lb 11.7 oz)   09/20/22 121.1 kg (267 lb)   02/25/22 121.1 kg (267 lb)   11/29/21 116.2 kg (256 lb 3.2 oz)   07/22/21 119.3 kg (263 lb)      Diabetes Care Management Summary   Diabetes Education Record Assessment/Progress Initial   Current Diabetes Risk Level Low   Diabetes Type   Diabetes Type  Type II  (now pregnant)   Diabetes History   Diabetes Diagnosis 0-1 year   Current Treatment Diet;Oral Medication  (Metformin 500mg daily)   Nutrition   Meal Planning eats out seldom;3 meals per day;water   What type of sweetener do you use? none   What type of beverages do you drink? water   Meal Plan 24 Hour Recall - Breakfast plain oatmeal and orange juice   Meal Plan 24 Hour Recall - Lunch sometimes skips, fries   Meal Plan 24 Hour Recall - Dinner Spaghetti and corn   Meal Plan 24 Hour Recall - Snack pretzels, orange, jello   Monitoring    Self Monitoring  not yet   Blood Glucose Logs No   Do you use a personal continuous glucose monitor? No   In the last month, how often have you had a low blood sugar reaction? never   Can you tell when your blood sugar is too high? no   Exercise    Exercise Type walking   Intensity Low   Frequency 3-5 Times per week   Duration > 1 hour   Social History   Primary Support Self   Barriers to Change   Barriers to Change None   Learning Challenges  None   Readiness to Learn    Readiness to Learn  Eager   Diabetes Education Assessment/Progress   Diabetes Disease Process (diabetes disease process and treatment options) Instructed/patient voiced/demonstrated understanding/Written Materials provided   Nutrition (Incorporating nutritional management into one's lifestyle) Instructed/patient voiced/demonstrated understanding/Written Materials provided   Physical Activity (incorporating physical activity into  one's lifestyle) Instructed/patient voiced/demonstrated understanding/Written Materials provided   Monitoring (monitoring blood glucose/other parameters & using results) Instructed/patient voiced/demonstrated understanding/Written Materials provided   Acute Complications (preventing, detecting, and treating acute complications) Discussed   Chronic Complications (preventing, detecting, and treating chronic complications) Discussed   Goals   Patient has selected/evaluated goals during today's session Yes, selected   Healthy Eating Set   Start Date 03/09/23   Monitoring Set   Start Date 03/09/23   Problem Solving Set   Start Date 03/09/23   Diabetes Care Plan/Intervention   Education Plan/Intervention In F/U DSMT   Diabetes Meal Plan   Restrictions Restricted Carbohydrate   Carbohydrate Per Meal 20-30g;30-45g   Carbohydrate Per Snack  15-20g;7-15g   Education Units of Time    Time Spent 60 min       Instructions Provided:   Definition and Diagnosis    Nutrition and Meal Planning    Support Plan/Coping Skills  Food label reading  Carbohydrate counting  Low carb snacks   Blood sugar monitoring  Hypoglycemia Management  Sick day guidelines  Sharps disposal     Comments:    I reviewed all of the above with patient. Patient reports not being sure of when she became a type 2 diabetic or what her hgb A1C is and RD found one from last year of 6.7%. This is the first time she has received diabetes education and is in the process of getting her glucometer and supplies to start checking her blood sugars.     We discussed eating 30-45 gm of carbohydrates per meal and 15-20gm carbohydrates per snack. Encouraged patient to check blood sugar 4x/day and adjust carbohydrate intake accordingly.     Education materials and contact information provided for questions. RD will follow up on     Goals:    1. Eat 30-45gm per meal and 15-20gm per snack of carbohydrates  2. Test blood sugars 4x/day and keep blood sugar log  3. Read food  labels    Thank you for the referral.      Shannen Yung MS, RD/LDN, [facility]

## 2023-04-03 ENCOUNTER — TELEPHONE (OUTPATIENT)
Dept: OBSTETRICS AND GYNECOLOGY | Facility: CLINIC | Age: 33
End: 2023-04-03
Payer: MEDICAID

## 2023-04-03 ENCOUNTER — PATIENT MESSAGE (OUTPATIENT)
Dept: OBSTETRICS AND GYNECOLOGY | Facility: CLINIC | Age: 33
End: 2023-04-03
Payer: MEDICAID

## 2023-04-11 ENCOUNTER — PATIENT MESSAGE (OUTPATIENT)
Dept: ADMINISTRATIVE | Facility: HOSPITAL | Age: 33
End: 2023-04-11
Payer: MEDICAID

## 2023-04-27 ENCOUNTER — HOSPITAL ENCOUNTER (OUTPATIENT)
Dept: PREADMISSION TESTING | Facility: HOSPITAL | Age: 33
Discharge: HOME | End: 2023-04-27
Attending: STUDENT IN AN ORGANIZED HEALTH CARE EDUCATION/TRAINING PROGRAM
Payer: MEDICAID

## 2023-04-27 DIAGNOSIS — O24.012 PREGNANCY COMPLICATED BY PRE-EXISTING TYPE 1 DIABETES, SECOND TRIMESTER: ICD-10-CM

## 2023-04-27 DIAGNOSIS — O24.012 PREGNANCY COMPLICATED BY PRE-EXISTING TYPE 1 DIABETES, SECOND TRIMESTER: Primary | ICD-10-CM

## 2023-04-27 PROCEDURE — 93010 ELECTROCARDIOGRAM REPORT: CPT | Mod: ,,, | Performed by: INTERNAL MEDICINE

## 2023-04-27 PROCEDURE — 93010 EKG 12-LEAD: ICD-10-PCS | Mod: ,,, | Performed by: INTERNAL MEDICINE

## 2023-04-27 PROCEDURE — 93005 ELECTROCARDIOGRAM TRACING: CPT | Performed by: INTERNAL MEDICINE

## 2023-09-06 ENCOUNTER — HOSPITAL ENCOUNTER (OUTPATIENT)
Facility: HOSPITAL | Age: 33
Discharge: HOME | End: 2023-09-06
Attending: STUDENT IN AN ORGANIZED HEALTH CARE EDUCATION/TRAINING PROGRAM | Admitting: STUDENT IN AN ORGANIZED HEALTH CARE EDUCATION/TRAINING PROGRAM
Payer: MEDICAID

## 2023-09-06 VITALS — SYSTOLIC BLOOD PRESSURE: 140 MMHG | RESPIRATION RATE: 20 BRPM | DIASTOLIC BLOOD PRESSURE: 84 MMHG | HEART RATE: 100 BPM

## 2023-09-06 DIAGNOSIS — O24.113 TYPE 2 DIABETES MELLITUS AFFECTING PREGNANCY IN THIRD TRIMESTER, ANTEPARTUM: ICD-10-CM

## 2023-09-06 DIAGNOSIS — O24.113 TYPE 2 DIABETES MELLITUS AFFECTING PREGNANCY IN THIRD TRIMESTER, ANTEPARTUM: Primary | ICD-10-CM

## 2023-09-06 PROCEDURE — 59025 FETAL NON-STRESS TEST: CPT | Mod: 59

## 2023-09-11 ENCOUNTER — HOSPITAL ENCOUNTER (OUTPATIENT)
Facility: HOSPITAL | Age: 33
Discharge: HOME | End: 2023-09-12
Attending: STUDENT IN AN ORGANIZED HEALTH CARE EDUCATION/TRAINING PROGRAM | Admitting: STUDENT IN AN ORGANIZED HEALTH CARE EDUCATION/TRAINING PROGRAM
Payer: MEDICAID

## 2023-09-11 DIAGNOSIS — R94.31 ABNORMAL EKG: ICD-10-CM

## 2023-09-11 DIAGNOSIS — O24.113 TYPE 2 DIABETES MELLITUS AFFECTING PREGNANCY IN THIRD TRIMESTER, ANTEPARTUM: ICD-10-CM

## 2023-09-11 LAB
ALBUMIN SERPL BCP-MCNC: 2.8 G/DL (ref 3.5–5.2)
ALP SERPL-CCNC: 92 U/L (ref 55–135)
ALT SERPL W/O P-5'-P-CCNC: 21 U/L (ref 10–44)
ANION GAP SERPL CALC-SCNC: 7 MMOL/L (ref 8–16)
AST SERPL-CCNC: 23 U/L (ref 10–40)
BACTERIA #/AREA URNS HPF: ABNORMAL /HPF
BASOPHILS # BLD AUTO: 0.04 K/UL (ref 0–0.2)
BASOPHILS NFR BLD: 0.3 % (ref 0–1.9)
BILIRUB SERPL-MCNC: 0.5 MG/DL (ref 0.1–1)
BILIRUB UR QL STRIP: NEGATIVE
BUN SERPL-MCNC: 13 MG/DL (ref 6–20)
CALCIUM SERPL-MCNC: 9.1 MG/DL (ref 8.7–10.5)
CHLORIDE SERPL-SCNC: 107 MMOL/L (ref 95–110)
CLARITY UR: CLEAR
CO2 SERPL-SCNC: 21 MMOL/L (ref 23–29)
COLOR UR: YELLOW
CREAT SERPL-MCNC: 1.1 MG/DL (ref 0.5–1.4)
CREAT UR-MCNC: 300 MG/DL (ref 15–325)
DIFFERENTIAL METHOD: ABNORMAL
EOSINOPHIL # BLD AUTO: 0.1 K/UL (ref 0–0.5)
EOSINOPHIL NFR BLD: 0.5 % (ref 0–8)
ERYTHROCYTE [DISTWIDTH] IN BLOOD BY AUTOMATED COUNT: 14.2 % (ref 11.5–14.5)
EST. GFR  (NO RACE VARIABLE): >60 ML/MIN/1.73 M^2
GLUCOSE SERPL-MCNC: 122 MG/DL (ref 70–110)
GLUCOSE UR QL STRIP: NEGATIVE
HCT VFR BLD AUTO: 34.9 % (ref 37–48.5)
HGB BLD-MCNC: 11.5 G/DL (ref 12–16)
HGB UR QL STRIP: NEGATIVE
HYALINE CASTS #/AREA URNS LPF: 9 /LPF
IMM GRANULOCYTES # BLD AUTO: 0.06 K/UL (ref 0–0.04)
IMM GRANULOCYTES NFR BLD AUTO: 0.5 % (ref 0–0.5)
KETONES UR QL STRIP: ABNORMAL
LEUKOCYTE ESTERASE UR QL STRIP: ABNORMAL
LYMPHOCYTES # BLD AUTO: 3.4 K/UL (ref 1–4.8)
LYMPHOCYTES NFR BLD: 26 % (ref 18–48)
MCH RBC QN AUTO: 28.8 PG (ref 27–31)
MCHC RBC AUTO-ENTMCNC: 33 G/DL (ref 32–36)
MCV RBC AUTO: 87 FL (ref 82–98)
MICROSCOPIC COMMENT: ABNORMAL
MONOCYTES # BLD AUTO: 1.3 K/UL (ref 0.3–1)
MONOCYTES NFR BLD: 10.3 % (ref 4–15)
NEUTROPHILS # BLD AUTO: 8.1 K/UL (ref 1.8–7.7)
NEUTROPHILS NFR BLD: 62.4 % (ref 38–73)
NITRITE UR QL STRIP: NEGATIVE
NRBC BLD-RTO: 0 /100 WBC
PH UR STRIP: 7 [PH] (ref 5–8)
PLATELET # BLD AUTO: 234 K/UL (ref 150–450)
PMV BLD AUTO: 12.7 FL (ref 9.2–12.9)
POTASSIUM SERPL-SCNC: 4.1 MMOL/L (ref 3.5–5.1)
PROT SERPL-MCNC: 6.8 G/DL (ref 6–8.4)
PROT UR QL STRIP: ABNORMAL
PROT UR-MCNC: 83 MG/DL (ref 6–15)
PROT/CREAT UR: 0.28 MG/G{CREAT} (ref 0–0.2)
RBC # BLD AUTO: 4 M/UL (ref 4–5.4)
RBC #/AREA URNS HPF: 1 /HPF (ref 0–4)
SODIUM SERPL-SCNC: 135 MMOL/L (ref 136–145)
SP GR UR STRIP: >1.03 (ref 1–1.03)
SQUAMOUS #/AREA URNS HPF: 4 /HPF
URN SPEC COLLECT METH UR: ABNORMAL
UROBILINOGEN UR STRIP-ACNC: ABNORMAL EU/DL
WBC # BLD AUTO: 13.02 K/UL (ref 3.9–12.7)
WBC #/AREA URNS HPF: 6 /HPF (ref 0–5)

## 2023-09-11 PROCEDURE — 93010 EKG 12-LEAD: ICD-10-PCS | Mod: ,,, | Performed by: SPECIALIST

## 2023-09-11 PROCEDURE — 84156 ASSAY OF PROTEIN URINE: CPT | Performed by: STUDENT IN AN ORGANIZED HEALTH CARE EDUCATION/TRAINING PROGRAM

## 2023-09-11 PROCEDURE — 80053 COMPREHEN METABOLIC PANEL: CPT | Performed by: STUDENT IN AN ORGANIZED HEALTH CARE EDUCATION/TRAINING PROGRAM

## 2023-09-11 PROCEDURE — 36415 COLL VENOUS BLD VENIPUNCTURE: CPT | Performed by: STUDENT IN AN ORGANIZED HEALTH CARE EDUCATION/TRAINING PROGRAM

## 2023-09-11 PROCEDURE — 93005 ELECTROCARDIOGRAM TRACING: CPT | Performed by: SPECIALIST

## 2023-09-11 PROCEDURE — 85025 COMPLETE CBC W/AUTO DIFF WBC: CPT | Performed by: STUDENT IN AN ORGANIZED HEALTH CARE EDUCATION/TRAINING PROGRAM

## 2023-09-11 PROCEDURE — 25000003 PHARM REV CODE 250: Performed by: STUDENT IN AN ORGANIZED HEALTH CARE EDUCATION/TRAINING PROGRAM

## 2023-09-11 PROCEDURE — 93010 ELECTROCARDIOGRAM REPORT: CPT | Mod: ,,, | Performed by: SPECIALIST

## 2023-09-11 PROCEDURE — 81001 URINALYSIS AUTO W/SCOPE: CPT | Performed by: STUDENT IN AN ORGANIZED HEALTH CARE EDUCATION/TRAINING PROGRAM

## 2023-09-11 RX ORDER — METFORMIN HYDROCHLORIDE 500 MG/1
500 TABLET ORAL 2 TIMES DAILY WITH MEALS
Status: DISCONTINUED | OUTPATIENT
Start: 2023-09-11 | End: 2023-09-12 | Stop reason: HOSPADM

## 2023-09-11 RX ADMIN — METFORMIN HYDROCHLORIDE 500 MG: 500 TABLET, FILM COATED ORAL at 09:09

## 2023-09-12 VITALS
HEART RATE: 87 BPM | DIASTOLIC BLOOD PRESSURE: 92 MMHG | OXYGEN SATURATION: 98 % | TEMPERATURE: 98 F | RESPIRATION RATE: 16 BRPM | SYSTOLIC BLOOD PRESSURE: 136 MMHG

## 2023-09-12 DIAGNOSIS — R94.31 NONSPECIFIC ABNORMAL ELECTROCARDIOGRAM (ECG) (EKG): Primary | ICD-10-CM

## 2023-09-12 LAB
ALBUMIN SERPL BCP-MCNC: 2.7 G/DL (ref 3.5–5.2)
ALP SERPL-CCNC: 91 U/L (ref 55–135)
ALT SERPL W/O P-5'-P-CCNC: 26 U/L (ref 10–44)
ANION GAP SERPL CALC-SCNC: 7 MMOL/L (ref 8–16)
AST SERPL-CCNC: 26 U/L (ref 10–40)
BILIRUB SERPL-MCNC: 0.9 MG/DL (ref 0.1–1)
BUN SERPL-MCNC: 11 MG/DL (ref 6–20)
CALCIUM SERPL-MCNC: 8.9 MG/DL (ref 8.7–10.5)
CHLORIDE SERPL-SCNC: 106 MMOL/L (ref 95–110)
CO2 SERPL-SCNC: 23 MMOL/L (ref 23–29)
CREAT SERPL-MCNC: 1 MG/DL (ref 0.5–1.4)
ERYTHROCYTE [DISTWIDTH] IN BLOOD BY AUTOMATED COUNT: 14.1 % (ref 11.5–14.5)
EST. GFR  (NO RACE VARIABLE): >60 ML/MIN/1.73 M^2
GLUCOSE SERPL-MCNC: 114 MG/DL (ref 70–110)
GLUCOSE SERPL-MCNC: 139 MG/DL (ref 70–110)
GLUCOSE SERPL-MCNC: 86 MG/DL (ref 70–110)
GLUCOSE SERPL-MCNC: 92 MG/DL (ref 70–110)
HCT VFR BLD AUTO: 34 % (ref 37–48.5)
HGB BLD-MCNC: 11.6 G/DL (ref 12–16)
MCH RBC QN AUTO: 29.4 PG (ref 27–31)
MCHC RBC AUTO-ENTMCNC: 34.1 G/DL (ref 32–36)
MCV RBC AUTO: 86 FL (ref 82–98)
PLATELET # BLD AUTO: 213 K/UL (ref 150–450)
PMV BLD AUTO: 12.6 FL (ref 9.2–12.9)
POTASSIUM SERPL-SCNC: 3.9 MMOL/L (ref 3.5–5.1)
PROT SERPL-MCNC: 6.8 G/DL (ref 6–8.4)
RBC # BLD AUTO: 3.94 M/UL (ref 4–5.4)
SODIUM SERPL-SCNC: 136 MMOL/L (ref 136–145)
WBC # BLD AUTO: 13.56 K/UL (ref 3.9–12.7)

## 2023-09-12 PROCEDURE — 25000003 PHARM REV CODE 250: Performed by: STUDENT IN AN ORGANIZED HEALTH CARE EDUCATION/TRAINING PROGRAM

## 2023-09-12 PROCEDURE — 80053 COMPREHEN METABOLIC PANEL: CPT | Performed by: STUDENT IN AN ORGANIZED HEALTH CARE EDUCATION/TRAINING PROGRAM

## 2023-09-12 PROCEDURE — 99214 OFFICE O/P EST MOD 30 MIN: CPT | Mod: ,,, | Performed by: INTERNAL MEDICINE

## 2023-09-12 PROCEDURE — 99214 PR OFFICE/OUTPT VISIT, EST, LEVL IV, 30-39 MIN: ICD-10-PCS | Mod: ,,, | Performed by: INTERNAL MEDICINE

## 2023-09-12 PROCEDURE — 36415 COLL VENOUS BLD VENIPUNCTURE: CPT | Performed by: STUDENT IN AN ORGANIZED HEALTH CARE EDUCATION/TRAINING PROGRAM

## 2023-09-12 PROCEDURE — 85027 COMPLETE CBC AUTOMATED: CPT | Performed by: STUDENT IN AN ORGANIZED HEALTH CARE EDUCATION/TRAINING PROGRAM

## 2023-09-12 RX ADMIN — METFORMIN HYDROCHLORIDE 500 MG: 500 TABLET, FILM COATED ORAL at 09:09

## 2023-09-12 NOTE — NURSING
[id number] Dr. Jordan Andre notified of Dr. Sow requesting a Cardiology consult for an abnormal EKG.  MD will see pt in am, orders to call back after 7am noted.

## 2023-09-12 NOTE — CONSULTS
[facility]  Department of Cardiology  Consult Note      PATIENT NAME: Perla Jean  MRN: [id number]  TODAY'S DATE: 09/12/2023  ADMIT DATE: 9/11/2023                          CONSULT REQUESTED BY: Jenny Sow,*    SUBJECTIVE     PRINCIPAL PROBLEM: <principal problem not specified>      REASON FOR CONSULT:    Abnormal ECG      HPI:      Patient is a 33-year-old female approximately 34 weeks pregnant with past medical history diabetes mellitus, epilepsy who presented for fetal NST and was admitted for hypertension and abnormal ECG.      EKG this admission reviewed.  Sinus arrhythmia noted early repolarization.  No acute STT wave changes.  Patient denies chest pain or shortness of breath.  No anginal equivalent symptoms.  Previous EKG from April 27, 2023 showed normal sinus rhythm with possible anterior infarct likely secondary to improper lead placement.        Review of patient's allergies indicates:  No Known Allergies    Past Medical History:   Diagnosis Date    Diabetes mellitus, type 2     Epilepsy     in childhood     Past Surgical History:   Procedure Laterality Date    CHOLECYSTECTOMY       Social History     Tobacco Use    Smoking status: Never    Smokeless tobacco: Never   Substance Use Topics    Alcohol use: No    Drug use: Never        REVIEW OF SYSTEMS    As mentioned in HPI    OBJECTIVE     VITAL SIGNS (Most Recent)  Temp: 98.3 °F (36.8 °C) (09/12/23 0301)  Pulse: 88 (09/12/23 0701)  Resp: 18 (09/12/23 0601)  BP: 125/79 (09/12/23 0701)  SpO2: 100 % (09/12/23 0610)    VENTILATION STATUS  Resp: 18 (09/12/23 0601)  SpO2: 100 % (09/12/23 0610)           I & O (Last 24H):No intake or output data in the 24 hours ending 09/12/23 0845    WEIGHTS  Wt Readings from Last 3 Encounters:   02/14/23 1326 116 kg (255 lb 11.7 oz)   09/20/22 1521 121.1 kg (267 lb)   02/25/22 0758 121.1 kg (267 lb)       PHYSICAL EXAM    CONSTITUTIONAL: NAD  HEENT: Normocephalic. No pallor  NECK: no JVD  LUNGS:  "CTA b/l  HEART: regular rate and rhythm, S1, S2 normal, no murmur   ABDOMEN: soft, non-tender, bowel sounds normal  EXTREMITIES: No edema  SKIN: No rash  NEURO: AAO X 3  PSYCH: normal affect      HOME MEDICATIONS:  No current facility-administered medications on file prior to encounter.     Current Outpatient Medications on File Prior to Encounter   Medication Sig Dispense Refill    acetaminophen (TYLENOL) 500 MG tablet Take 2 tablets (1,000 mg total) by mouth every 6 (six) hours as needed for Pain. 30 tablet 0    metFORMIN (GLUCOPHAGE-XR) 500 MG ER 24hr tablet Take 1 tablet (500 mg total) by mouth daily with breakfast. 90 tablet 1    prenatal no118-iron-folic acid 29 mg iron- 1 mg Chew Take 1 tablet by mouth once daily. 30 tablet 3    pyridoxine, vitamin B6, (B-6) 100 MG Tab Take 1 tablet (100 mg total) by mouth once daily. 90 tablet 0    semaglutide (OZEMPIC) 0.25 mg or 0.5 mg(2 mg/1.5 mL) pen injector Start 0.25mg weekly injection for 4 weeks then increase to 0.5mg weekly if tolerating 1 pen 5       SCHEDULED MEDS:   metFORMIN  500 mg Oral BID WM       CONTINUOUS INFUSIONS:    PRN MEDS:    LABS AND DIAGNOSTICS     CBC LAST 3 DAYS  Recent Labs   Lab 09/11/23 1828 09/12/23  0756   WBC 13.02* 13.56*   RBC 4.00 3.94*   HGB 11.5* 11.6*   HCT 34.9* 34.0*   MCV 87 86   MCH 28.8 29.4   MCHC 33.0 34.1   RDW 14.2 14.1    213   MPV 12.7 12.6   GRAN 62.4  8.1*  --    LYMPH 26.0  3.4  --    MONO 10.3  1.3*  --    BASO 0.04  --    NRBC 0  --        COAGULATION LAST 3 DAYS  No results for input(s): "LABPT", "INR", "APTT" in the last 168 hours.    CHEMISTRY LAST 3 DAYS  Recent Labs   Lab 09/11/23 1828 09/12/23  0756   * 136   K 4.1 3.9    106   CO2 21* 23   ANIONGAP 7* 7*   BUN 13 11   CREATININE 1.1 1.0   * 114*   CALCIUM 9.1 8.9   ALBUMIN 2.8* 2.7*   PROT 6.8 6.8   ALKPHOS 92 91   ALT 21 26   AST 23 26   BILITOT 0.5 0.9       CARDIAC PROFILE LAST 3 DAYS  No results for input(s): "BNP", "CPK", " ""CPKMB", "LDH", "TROPONINI", "TROPONINIHS" in the last 168 hours.    ENDOCRINE LAST 3 DAYS  No results for input(s): "TSH", "PROCAL" in the last 168 hours.    LAST ARTERIAL BLOOD GAS  ABG  No results for input(s): "PH", "PO2", "PCO2", "HCO3", "BE" in the last 168 hours.    LAST 7 DAYS MICROBIOLOGY   Microbiology Results (last 7 days)       Procedure Component Value Units Date/Time    C. trachomatis/N. gonorrhoeae by AMP DNA [[id number]] Resulted: 02/28/23    Order Status: Completed Specimen: Genital from Vaginal Updated: 09/12/23 0354     Gonorrhea negative     Chlamydia, Amplified DNA negative            MOST RECENT IMAGING  US OB Limited with Biophysical Profile (xpd)  Reason: non reactive nst    COMPARISON: None    FINDINGS:  Transabdominal scintigraphy shows single live intrauterine fetus in cephalic lie with heart rate of 143 bpm. Cervix is partially obscured, although visualized portion of the cervix appears closed measuring at least 3.9 cm. Anterior placenta contains no retroplacental fluid collection. 4 quadrant amniotic fluid index measures 14.2 cm with largest single vertical pocket measured was 7 cm.    Fetus received a score of 2 out of 2 for amniotic fluid, breathing, movement, and tone. BPP score is 8 out of 8.    IMPRESSION:  Single live uterine gestation with BPP score 8 out of 8.    Electronically signed by:  James Matthews MD  9/6/2023 5:02 PM CDT Workstation: [id number]      ECHOCARDIOGRAM RESULTS (last 5)  No results found for this or any previous visit.      CURRENT/PREVIOUS VISIT EKG  Results for orders placed or performed during the hospital encounter of 09/11/23   EKG 12-lead    Collection Time: 09/11/23  6:57 PM    Narrative    Test Reason : R94.31,    Vent. Rate : 086 BPM     Atrial Rate : 086 BPM     P-R Int : 166 ms          QRS Dur : 070 ms      QT Int : 332 ms       P-R-T Axes : 036 005 026 degrees     QTc Int : 397 ms    Sinus rhythm with marked sinus arrythmia  Otherwise normal " ECG  When compared with ECG of 27-APR-2023 13:27,  No significant change was found    Referred By:             Confirmed By:            ASSESSMENT/PLAN:     There are no active hospital problems to display for this patient.      ASSESSMENT & PLAN:     34 weeks gestation  Abnormal ECG- sinus arrhythmia  Anemia  Leukocytosis      RECOMMENDATIONS:    Patient denies chest pain or anginal equivalent symptoms.  EKGs reviewed which shows sinus arrhythmia.  No acute STT wave changes.  Recommend echocardiogram.  This can be performed in the outpatient setting as patient is due to be discharged.  Recommend patient follow up Cardiology in outpatient setting.  Close monitoring of blood pressure outpatient.  Encourage patient to keep a log.      Candida Tellez NP  Department of Cardiology  Date of Service: 09/12/2023        I have personally interviewed and examined the patient, I have reviewed the Nurse Practitioner's history and physical, assessment, and plan. I agree with the findings and plan.  1. Patient is into 34th week of gestation and had come in for evaluation of the fetus.  And patient had an EKG done on her last visit was found to have poor R-wave progression in the anterior precordial leads and opted cardiology follow-up in the clinic but patient neglected to do so.  Since patient is in the hospital cardiology consult was called in for abnormal EKG.    Patient has a significant heavy body habitus and the placement of the EKG leads could have been placed at a higher position and thus the abnormal EKG.  Today's EKG shows sinus arrhythmia which is normal and early repolarization changes which is normally seen in younger people.  2. Patient has no specific complaints denies any chest pain or shortness a breath calf tenderness or difficulty in breathing denies any dizziness or palpitations or loss of consciousness.  3. In recommendations as above and thank you for the consultation.  And echocardiogram is going to be  done as an outpatient.        Cliff Andre M.D.  Department of Cardiology  Date of Service: 09/12/2023  8:45 AM

## 2023-09-12 NOTE — PLAN OF CARE
[facility]  Initial Discharge Assessment       Primary Care Provider: Shreya Munoz NP    Admission Diagnosis: No admission diagnoses are documented for this encounter.    Admission Date: 9/11/2023  Expected Discharge Date:     Pt 33-year-old female, arrived from home with No active principal problem. Discharge assessment completed with patient at bedside. Information verified as correct on facesheet. Pt is oriented to person, place, and time. Denies DME, HH, Dialysis, and Coumadin. Patient reports she drives self/independent in [place]. Discharge plan is home with family; sister Tracey Jean [phone] will provide transport on discharge. Patient denies any discharge needs at this time. CM will continue to monitor.   Transition of Care Barriers: None    Payor: MEDICAID / Plan: [place] [employer] CONNECT / Product Type: Managed Medicaid /     Extended Emergency Contact Information  Primary Emergency Contact: Tracey Jean  Mobile Phone: [phone]  Relation: Sister    Discharge Plan A: Home with family  Discharge Plan B: Home      [id number] - [place] - [address] AT [facility] [address]  [address]  [place] [address]  Phone: [phone] Fax: [phone]      Initial Assessment (most recent)       Adult Discharge Assessment - 09/12/23 1102          Discharge Assessment    Assessment Type Discharge Planning Assessment     Confirmed/corrected address, phone number and insurance Yes     Confirmed Demographics Correct on Facesheet     Source of Information patient     When was your last doctors appointment? --   Feb 2023    Communicated SHAMA with patient/caregiver Date not available/Unable to determine     Reason For Admission No active principal problem     People in Home parent(s)     Facility Arrived From: Home     Do you expect to return to your current living situation? Yes     Do you have help at home or someone to help you manage your care at home?  Yes     Who are your caregiver(s) and their phone number(s)? Tracey Jean/sister [phone]     Prior to hospitilization cognitive status: Alert/Oriented     Current cognitive status: Alert/Oriented     Equipment Currently Used at Home none     Readmission within 30 days? No     Patient currently being followed by outpatient case management? No     Do you currently have service(s) that help you manage your care at home? No     Do you take prescription medications? Yes     Do you have prescription coverage? Yes     Coverage Payor:  MEDICAID - [facility] CONNECT     Do you have any problems affording any of your prescribed medications? No     Is the patient taking medications as prescribed? yes     Who is going to help you get home at discharge? Tracey Blackwell/[phone]     How do you get to doctors appointments? car, drives self     Are you on dialysis? No     Do you take coumadin? No     DME Needed Upon Discharge  none     Discharge Plan discussed with: Patient     Transition of Care Barriers None     Discharge Plan A Home with family     Discharge Plan B Home

## 2023-09-12 NOTE — NURSING
pt d/c to home. ambulatory at d/c. no acute distress noted. verbalized understanding of d/c instructions. f/u appts. no questions at this time. VSS. No rx given. Pt to return with 24 hr urine at 1900.  Pt to return for NST. F/u with Dr. Sow. And Outpatient to contact patient about ECHO.

## 2023-09-12 NOTE — NURSING
2015 Dr. Sow notified of pt lab results and EKG results.  Orders to observe overnight , start a 24 hour urine, put patient on telemetry, get a cardiology consult for the abnormal EKG reading, continuous fetal monitoring; accu checks fasting and 2 hours pp; continuous pulse ox.  Pt may have a 2200 calorie ADA diet and continue on metformin 500mg BID  noted.

## 2023-09-12 NOTE — DISCHARGE INSTRUCTIONS
Keep your scheduled appointment with your provider.    Call your Doctor if you have any of the following:  Temperature above 100 degrees  Nausea, vomiting and/or diarrhea  Severe headache, dizziness, or blurred vision  Notable increase in swelling of hands or feet  Notable swelling of face and lips  Difficulty, pain or burning with urination  Foul smelling vaginal discharge  Decreased fetal movement    Come to the hospital if you have any of the following symptoms:  Your water breaks  More than 4-6 contractions in 1 hour for 2 or more hours  Vaginal bleeding like a period    After 28 weeks, you should feel 10 distinct fetal movements within a 2 hour period.    It is recommended that you drink 1/2 a gallon of water each day.  Tea, Soda and Juice are  in addition to this.     Follow Up with Dr. Sow

## 2023-09-12 NOTE — NURSING
Call to Dr. Monroe on call for demarcus asked to call answering service. Answering service called; left message for consult for abnormal EKG.

## 2023-09-12 NOTE — H&P
[facility]  Obstetrics  History & Physical    Patient Name: Perla Jean  MRN: [id number]  Admission Date: 2023  Primary Care Provider: Shreya Munoz NP    Subjective:     Principal Problem: CHTN, rule out preE, DM-2, h/o abnormal EKG    History of Present Illness: 32yo  @ 34w0d presents for scheduled NST. BPs noted to be above baseline. She is compliant with her Labetalol 200mg PO BID. No c/o HA, change in vision, CP, SOB, RUQ pain, change in swelling. No OB c/o.    This pregnancy has been complicated by CHTN, DM-2, abnormal EKG with no f/u    OB History    Para Term  AB Living   2 1 0 1 0 1   SAB IAB Ectopic Multiple Live Births   0 0 0 0 1      # Outcome Date GA Lbr Salvatore/2nd Weight Sex Delivery Anes PTL Lv   2 Current            1  11 34w0d   F Vag-Spont   BRAYAN     Past Medical History:   Diagnosis Date    Diabetes mellitus, type 2     Epilepsy     in childhood     Past Surgical History:   Procedure Laterality Date    CHOLECYSTECTOMY         PTA Medications   Medication Sig    acetaminophen (TYLENOL) 500 MG tablet Take 2 tablets (1,000 mg total) by mouth every 6 (six) hours as needed for Pain.    metFORMIN (GLUCOPHAGE-XR) 500 MG ER 24hr tablet Take 1 tablet (500 mg total) by mouth daily with breakfast.    prenatal no118-iron-folic acid 29 mg iron- 1 mg Chew Take 1 tablet by mouth once daily.    pyridoxine, vitamin B6, (B-6) 100 MG Tab Take 1 tablet (100 mg total) by mouth once daily.    semaglutide (OZEMPIC) 0.25 mg or 0.5 mg(2 mg/1.5 mL) pen injector Start 0.25mg weekly injection for 4 weeks then increase to 0.5mg weekly if tolerating       Review of patient's allergies indicates:  No Known Allergies     Family History       Problem Relation (Age of Onset)    Arthritis Mother    Asthma Mother    No Known Problems Father          Tobacco Use    Smoking status: Never    Smokeless tobacco: Never   Substance and Sexual Activity    Alcohol use: No    Drug use:  Never    Sexual activity: Yes     Partners: Male     Review of Systems negative  Objective:     Vital Signs (Most Recent):  Temp: 97.9 °F (36.6 °C) (09/12/23 0733)  Pulse: 85 (09/12/23 0902)  Resp: 17 (09/12/23 0902)  BP: (!) 148/87 (09/12/23 0902)  SpO2: 98 % (09/12/23 0733) Vital Signs (24h Range):  Temp:  [97.9 °F (36.6 °C)-98.8 °F (37.1 °C)] 97.9 °F (36.6 °C)  Pulse:  [] 85  Resp:  [17-20] 17  SpO2:  [97 %-100 %] 98 %  BP: (110-149)/(58-97) 148/87        There is no height or weight on file to calculate BMI.    FHT: reactive and reassuring  TOCO:  none    Physical Exam  NAD, AAO  Regular rate  Nonlabored respirations  Ab: soft, ND, NT, gravid  Extrem: 1+ nonpitting edema      Significant Labs:  Lab Results   Component Value Date    [facility]04[facility]    HEPBSAG Negative 02/28/2023      Latest Reference Range & Units 09/11/23 18:28   WBC 3.90 - 12.70 K/uL 13.02 (H)   RBC 4.00 - 5.40 M/uL 4.00   Hemoglobin 12.0 - 16.0 g/dL 11.5 (L)   Hematocrit 37.0 - 48.5 % 34.9 (L)   MCV 82 - 98 fL 87   MCH 27.0 - 31.0 pg 28.8   MCHC 32.0 - 36.0 g/dL 33.0   RDW 11.5 - 14.5 % 14.2   Platelets 150 - 450 K/uL 234   MPV 9.2 - 12.9 fL 12.7   Gran % 38.0 - 73.0 % 62.4   Lymph % 18.0 - 48.0 % 26.0   Mono % 4.0 - 15.0 % 10.3   Eosinophil % 0.0 - 8.0 % 0.5   Basophil % 0.0 - 1.9 % 0.3   Immature Granulocytes 0.0 - 0.5 % 0.5   Gran # (ANC) 1.8 - 7.7 K/uL 8.1 (H)   Lymph # 1.0 - 4.8 K/uL 3.4   Mono # 0.3 - 1.0 K/uL 1.3 (H)   Eos # 0.0 - 0.5 K/uL 0.1   Baso # 0.00 - 0.20 K/uL 0.04   Immature Grans (Abs) 0.00 - 0.04 K/uL 0.06 (H)   nRBC 0 /100 WBC 0   Differential Method  Automated   Sodium 136 - 145 mmol/L 135 (L)   Potassium 3.5 - 5.1 mmol/L 4.1   Chloride 95 - 110 mmol/L 107   CO2 23 - 29 mmol/L 21 (L)   Anion Gap 8 - 16 mmol/L 7 (L)   BUN 6 - 20 mg/dL 13   Creatinine 0.5 - 1.4 mg/dL 1.1   eGFR >60 mL/min/1.73 m^2 >60.0   Glucose 70 - 110 mg/dL 122 (H)   Calcium 8.7 - 10.5 mg/dL 9.1   Alkaline Phosphatase 55 - 135 U/L 92    PROTEIN TOTAL 6.0 - 8.4 g/dL 6.8   Albumin 3.5 - 5.2 g/dL 2.8 (L)   BILIRUBIN TOTAL 0.1 - 1.0 mg/dL 0.5   AST 10 - 40 U/L 23   ALT 10 - 44 U/L 21   (H): Data is abnormally high  (L): Data is abnormally low     Latest Reference Range & Units 23 19:00   Specimen UA  Urine, Clean Catch   Color, UA Yellow, Straw, Karissa  Yellow   Appearance, UA Clear  Clear   Specific Gravity, UA 1.005 - 1.030  >1.030 !   pH, UA 5.0 - 8.0  7.0   Protein, UA Negative  2+ !   Glucose, UA Negative  Negative   Ketones, UA Negative  Trace !   Occult Blood UA Negative  Negative   NITRITE UA Negative  Negative   UROBILINOGEN UA Negative EU/dL 2.0-3.0 !   Bilirubin (UA) Negative  Negative   Leukocytes, UA Negative  Trace !   RBC, UA 0 - 4 /hpf 1   WBC, UA 0 - 5 /hpf 6 (H)   Bacteria, UA None-Occ /hpf Rare   Squam Epithel, UA /hpf 4   Hyaline Casts, UA 0-1/lpf /lpf 9 !   Microscopic Comment  SEE COMMENT   Creatinine, Urine 15.0 - 325.0 mg/dL 300.0   Protein, Urine Random 6 - 15 mg/dL 83 (H)   Prot/Creat Ratio, Urine 0.00 - 0.20  0.28 (H)   PROTEIN URINE 0 - 15 mg/dL 82 (H)   !: Data is abnormal  (H): Data is abnormally high    EKG:  Test Reason : R94.31,     Vent. Rate : 086 BPM     Atrial Rate : 086 BPM      P-R Int : 166 ms          QRS Dur : 070 ms       QT Int : 332 ms       P-R-T Axes : 036 005 026 degrees      QTc Int : 397 ms     Sinus rhythm with marked sinus arrythmia   Otherwise normal ECG   When compared with ECG of 2023 13:27,   No significant change was found   Confirmed by Mark Thompson MD ([id number]) on 2023 7:56:54 PM     Referred By:             Confirmed By:Mark Thompson MD    Assessment/Plan:     33 y.o. female  at 34w0d for:    -CHTN, rule out preE: labs thusfar normal. F/u 24hr urine prot. OBS overnight. BPs normotensive to mild range. Continue home meds  -DM-2: continue acucchecks. Diabetic diet  -Abnormal EKG: on 23  Test Reason : O24.012,     Vent. Rate : 097 BPM     Atrial Rate : 097 BPM       P-R Int : 162 ms          QRS Dur : 072 ms       QT Int : 332 ms       P-R-T Axes : 038 004 027 degrees      QTc Int : 421 ms     Normal sinus rhythm   Cannot rule out Anterior infarct ,age undetermined   Abnormal ECG   No previous ECGs available   Confirmed by Cliff Andre MD ([id number]) on 5/3/2023 10:36:20 PM     Repeat EKG above. Will consult Cardiology for review while pt in house  -FHT reactive and reassuring    Jenny Sow MD, MD  Obstetrics  [facility]

## 2023-09-19 NOTE — DISCHARGE SUMMARY
[facility]  Obstetrics  Discharge Summary      Patient Name: Perla Jean  MRN: [id number]  Admission Date: 2023  Hospital Length of Stay: 0 days  Discharge Date and Time: 2023  2:42 PM  Attending Physician: No att. providers found   Discharging Provider: Esvin Sow MD, MD   Primary Care Provider: Shreya Munoz NP      Hospital Course:   32yo  @ 34w0d presents for scheduled NST. BPs noted to be above baseline. She is compliant with her Labetalol 200mg PO BID. No c/o HA, change in vision, CP, SOB, RUQ pain, change in swelling. No OB c/o.  Admitted for rule out preeclampsia.  Blood pressures normotensive to mild range.  Continue home meds.  Preeclampsia blood work normal, but proteinuria noted this encounter consistent with preeclampsia, mild this point.  Patient is a type 2 diabetic as well and will continue her Accu-Cheks.  Of note she had an abnormal EKG during her pregnancy and a repeat was ordered during her observation.  Was read as abnormal sinus rhythm with marked sinus arrhythmia.  Cardiology consulted for evaluation.  Appreciate recs.  Patient cleared for discharge and will follow up for outpatient echo.  Strong precautions reviewed with patient and she will follow-up with me in the office, continue twice weekly NSTs, and schedule her echo.  Fetal heart tones reactive and reassuring during her observation.     Consults (From admission, onward)          Status Ordering Provider     Inpatient consult to Cardiology  Once        Provider:  Jordan Andre MD    Completed ESVIN SOW            There are no hospital problems to display for this patient.       Significant Diagnostic Studies:    Latest Reference Range & Units 23 07:56   WBC 3.90 - 12.70 K/uL 13.56 (H)   RBC 4.00 - 5.40 M/uL 3.94 (L)   Hemoglobin 12.0 - 16.0 g/dL 11.6 (L)   Hematocrit 37.0 - 48.5 % 34.0 (L)   MCV 82 - 98 fL 86   MCH 27.0 - 31.0 pg 29.4   MCHC 32.0 - 36.0 g/dL  34.1   RDW 11.5 - 14.5 % 14.1   Platelets 150 - 450 K/uL 213   MPV 9.2 - 12.9 fL 12.6   Sodium 136 - 145 mmol/L 136   Potassium 3.5 - 5.1 mmol/L 3.9   Chloride 95 - 110 mmol/L 106   CO2 23 - 29 mmol/L 23   Anion Gap 8 - 16 mmol/L 7 (L)   BUN 6 - 20 mg/dL 11   Creatinine 0.5 - 1.4 mg/dL 1.0   eGFR >60 mL/min/1.73 m^2 >60.0   Glucose 70 - 110 mg/dL 114 (H)   Calcium 8.7 - 10.5 mg/dL 8.9   Alkaline Phosphatase 55 - 135 U/L 91   PROTEIN TOTAL 6.0 - 8.4 g/dL 6.8   Albumin 3.5 - 5.2 g/dL 2.7 (L)   BILIRUBIN TOTAL 0.1 - 1.0 mg/dL 0.9   AST 10 - 40 U/L 26   ALT 10 - 44 U/L 26   (H): Data is abnormally high  (L): Data is abnormally low     Latest Reference Range & Units 09/11/23 19:00   Specimen UA  Urine, Clean Catch   Color, UA Yellow, Straw, Karissa  Yellow   Appearance, UA Clear  Clear   Specific Gravity, UA 1.005 - 1.030  >1.030 !   pH, UA 5.0 - 8.0  7.0   Protein, UA Negative  2+ !   Glucose, UA Negative  Negative   Ketones, UA Negative  Trace !   Occult Blood UA Negative  Negative   NITRITE UA Negative  Negative   UROBILINOGEN UA Negative EU/dL 2.0-3.0 !   Bilirubin (UA) Negative  Negative   Leukocytes, UA Negative  Trace !   RBC, UA 0 - 4 /hpf 1   WBC, UA 0 - 5 /hpf 6 (H)   Bacteria, UA None-Occ /hpf Rare   Squam Epithel, UA /hpf 4   Hyaline Casts, UA 0-1/lpf /lpf 9 !   Microscopic Comment  SEE COMMENT   Creatinine, Urine 15.0 - 325.0 mg/dL 300.0   Protein, Urine Random 6 - 15 mg/dL 83 (H)   Prot/Creat Ratio, Urine 0.00 - 0.20  0.28 (H)   PROTEIN URINE 0 - 15 mg/dL 82 (H)   Urine Protein, Timed 6 - 100 mg/Spec 410 (H)   !: Data is abnormal  (H): Data is abnormally high    Lab Results   Component Value Date    GROUPTR AB POS 04/04/2013       Discharged Condition: stable    Disposition: Home or Self Care    Follow Up:  With me in office, twice weekly NSTs, to schedule echo    Patient Instructions:   See in chart    Medications:  Discharge Medication List as of 9/12/2023  2:18 PM        CONTINUE these medications which  have NOT CHANGED    Details   acetaminophen (TYLENOL) 500 MG tablet Take 2 tablets (1,000 mg total) by mouth every 6 (six) hours as needed for Pain., Starting Sun 9/15/2019, Print      metFORMIN (GLUCOPHAGE-XR) 500 MG ER 24hr tablet Take 1 tablet (500 mg total) by mouth daily with breakfast., Starting Tue 9/20/2022, Normal      prenatal no118-iron-folic acid 29 mg iron- 1 mg Chew Take 1 tablet by mouth once daily., Starting Tue 2/14/2023, Normal      pyridoxine, vitamin B6, (B-6) 100 MG Tab Take 1 tablet (100 mg total) by mouth once daily., Starting Tue 2/14/2023, Until Wed 2/14/2024, Normal      semaglutide (OZEMPIC) 0.25 mg or 0.5 mg(2 mg/1.5 mL) pen injector Start 0.25mg weekly injection for 4 weeks then increase to 0.5mg weekly if tolerating, Normal         Patient is not currently taking Ozempic    Jenny Sow MD, MD  Obstetrics  [facility] hospital

## 2023-09-25 ENCOUNTER — HOSPITAL ENCOUNTER (OUTPATIENT)
Facility: HOSPITAL | Age: 33
Discharge: HOME | End: 2023-09-25
Attending: STUDENT IN AN ORGANIZED HEALTH CARE EDUCATION/TRAINING PROGRAM | Admitting: STUDENT IN AN ORGANIZED HEALTH CARE EDUCATION/TRAINING PROGRAM
Payer: MEDICAID

## 2023-09-25 VITALS — SYSTOLIC BLOOD PRESSURE: 146 MMHG | DIASTOLIC BLOOD PRESSURE: 85 MMHG | RESPIRATION RATE: 17 BRPM | HEART RATE: 93 BPM

## 2023-09-25 DIAGNOSIS — O24.419 DM (DIABETES MELLITUS), GESTATIONAL: ICD-10-CM

## 2023-09-25 LAB
ALBUMIN SERPL BCP-MCNC: 3.6 G/DL (ref 3.5–5.2)
ALP SERPL-CCNC: 134 U/L (ref 55–135)
ALT SERPL W/O P-5'-P-CCNC: 20 U/L (ref 10–44)
ANION GAP SERPL CALC-SCNC: 10 MMOL/L (ref 8–16)
AST SERPL-CCNC: 18 U/L (ref 10–40)
BASOPHILS # BLD AUTO: 0.05 K/UL (ref 0–0.2)
BASOPHILS NFR BLD: 0.4 % (ref 0–1.9)
BILIRUB SERPL-MCNC: 0.5 MG/DL (ref 0.1–1)
BUN SERPL-MCNC: 8 MG/DL (ref 6–20)
CALCIUM SERPL-MCNC: 9.1 MG/DL (ref 8.7–10.5)
CHLORIDE SERPL-SCNC: 104 MMOL/L (ref 95–110)
CO2 SERPL-SCNC: 21 MMOL/L (ref 23–29)
CREAT SERPL-MCNC: 1.1 MG/DL (ref 0.5–1.4)
CREAT UR-MCNC: 197.7 MG/DL (ref 15–325)
DIFFERENTIAL METHOD: ABNORMAL
EOSINOPHIL # BLD AUTO: 0.1 K/UL (ref 0–0.5)
EOSINOPHIL NFR BLD: 0.8 % (ref 0–8)
ERYTHROCYTE [DISTWIDTH] IN BLOOD BY AUTOMATED COUNT: 14.3 % (ref 11.5–14.5)
EST. GFR  (NO RACE VARIABLE): >60 ML/MIN/1.73 M^2
GLUCOSE SERPL-MCNC: 93 MG/DL (ref 70–110)
HCT VFR BLD AUTO: 39.5 % (ref 37–48.5)
HGB BLD-MCNC: 12.9 G/DL (ref 12–16)
IMM GRANULOCYTES # BLD AUTO: 0.05 K/UL (ref 0–0.04)
IMM GRANULOCYTES NFR BLD AUTO: 0.4 % (ref 0–0.5)
LYMPHOCYTES # BLD AUTO: 3.7 K/UL (ref 1–4.8)
LYMPHOCYTES NFR BLD: 27.7 % (ref 18–48)
MCH RBC QN AUTO: 28.4 PG (ref 27–31)
MCHC RBC AUTO-ENTMCNC: 32.7 G/DL (ref 32–36)
MCV RBC AUTO: 87 FL (ref 82–98)
MONOCYTES # BLD AUTO: 1 K/UL (ref 0.3–1)
MONOCYTES NFR BLD: 7.9 % (ref 4–15)
NEUTROPHILS # BLD AUTO: 8.3 K/UL (ref 1.8–7.7)
NEUTROPHILS NFR BLD: 62.8 % (ref 38–73)
NRBC BLD-RTO: 0 /100 WBC
PLATELET # BLD AUTO: 282 K/UL (ref 150–450)
PMV BLD AUTO: 12.8 FL (ref 9.2–12.9)
POTASSIUM SERPL-SCNC: 4 MMOL/L (ref 3.5–5.1)
PROT SERPL-MCNC: 7.3 G/DL (ref 6–8.4)
PROT UR-MCNC: 62 MG/DL (ref 6–15)
PROT/CREAT UR: 0.31 MG/G{CREAT} (ref 0–0.2)
RBC # BLD AUTO: 4.54 M/UL (ref 4–5.4)
SODIUM SERPL-SCNC: 135 MMOL/L (ref 136–145)
WBC # BLD AUTO: 13.2 K/UL (ref 3.9–12.7)

## 2023-09-25 PROCEDURE — 84156 ASSAY OF PROTEIN URINE: CPT | Performed by: STUDENT IN AN ORGANIZED HEALTH CARE EDUCATION/TRAINING PROGRAM

## 2023-09-25 PROCEDURE — 85025 COMPLETE CBC W/AUTO DIFF WBC: CPT | Performed by: STUDENT IN AN ORGANIZED HEALTH CARE EDUCATION/TRAINING PROGRAM

## 2023-09-25 PROCEDURE — 80053 COMPREHEN METABOLIC PANEL: CPT | Performed by: STUDENT IN AN ORGANIZED HEALTH CARE EDUCATION/TRAINING PROGRAM

## 2023-09-25 PROCEDURE — 59025 FETAL NON-STRESS TEST: CPT | Mod: 59

## 2023-09-25 PROCEDURE — 36415 COLL VENOUS BLD VENIPUNCTURE: CPT | Performed by: STUDENT IN AN ORGANIZED HEALTH CARE EDUCATION/TRAINING PROGRAM

## 2023-10-02 DIAGNOSIS — E11.9 DIABETES MELLITUS: Primary | ICD-10-CM

## 2023-10-03 ENCOUNTER — ANESTHESIA (OUTPATIENT)
Dept: OBSTETRICS AND GYNECOLOGY | Facility: HOSPITAL | Age: 33
End: 2023-10-03
Payer: MEDICAID

## 2023-10-03 ENCOUNTER — ANESTHESIA EVENT (OUTPATIENT)
Dept: OBSTETRICS AND GYNECOLOGY | Facility: HOSPITAL | Age: 33
End: 2023-10-03
Payer: MEDICAID

## 2023-10-03 ENCOUNTER — HOSPITAL ENCOUNTER (INPATIENT)
Facility: HOSPITAL | Age: 33
LOS: 3 days | Discharge: HOME | End: 2023-10-06
Attending: STUDENT IN AN ORGANIZED HEALTH CARE EDUCATION/TRAINING PROGRAM | Admitting: STUDENT IN AN ORGANIZED HEALTH CARE EDUCATION/TRAINING PROGRAM
Payer: MEDICAID

## 2023-10-03 DIAGNOSIS — E11.9 DIABETES MELLITUS: ICD-10-CM

## 2023-10-03 LAB
ABO + RH BLD: NORMAL
ALBUMIN SERPL BCP-MCNC: 3.6 G/DL (ref 3.5–5.2)
ALP SERPL-CCNC: 160 U/L (ref 55–135)
ALT SERPL W/O P-5'-P-CCNC: 22 U/L (ref 10–44)
ANION GAP SERPL CALC-SCNC: 10 MMOL/L (ref 8–16)
AST SERPL-CCNC: 24 U/L (ref 10–40)
BACTERIA #/AREA URNS HPF: NEGATIVE /HPF
BASOPHILS # BLD AUTO: 0.03 K/UL (ref 0–0.2)
BASOPHILS NFR BLD: 0.2 % (ref 0–1.9)
BILIRUB SERPL-MCNC: 0.5 MG/DL (ref 0.1–1)
BILIRUB UR QL STRIP: NEGATIVE
BLD GP AB SCN CELLS X3 SERPL QL: NORMAL
BUN SERPL-MCNC: 11 MG/DL (ref 6–20)
CALCIUM SERPL-MCNC: 9.5 MG/DL (ref 8.7–10.5)
CHLORIDE SERPL-SCNC: 106 MMOL/L (ref 95–110)
CLARITY UR: CLEAR
CO2 SERPL-SCNC: 19 MMOL/L (ref 23–29)
COLOR UR: YELLOW
CREAT SERPL-MCNC: 1 MG/DL (ref 0.5–1.4)
DIFFERENTIAL METHOD: ABNORMAL
EOSINOPHIL # BLD AUTO: 0.1 K/UL (ref 0–0.5)
EOSINOPHIL NFR BLD: 0.4 % (ref 0–8)
ERYTHROCYTE [DISTWIDTH] IN BLOOD BY AUTOMATED COUNT: 13.9 % (ref 11.5–14.5)
EST. GFR  (NO RACE VARIABLE): >60 ML/MIN/1.73 M^2
GLUCOSE SERPL-MCNC: 80 MG/DL (ref 70–110)
GLUCOSE UR QL STRIP: NEGATIVE
HCT VFR BLD AUTO: 37.3 % (ref 37–48.5)
HGB BLD-MCNC: 12.2 G/DL (ref 12–16)
HGB UR QL STRIP: NEGATIVE
HYALINE CASTS #/AREA URNS LPF: 10 /LPF
IMM GRANULOCYTES # BLD AUTO: 0.07 K/UL (ref 0–0.04)
IMM GRANULOCYTES NFR BLD AUTO: 0.5 % (ref 0–0.5)
KETONES UR QL STRIP: NEGATIVE
LEUKOCYTE ESTERASE UR QL STRIP: ABNORMAL
LYMPHOCYTES # BLD AUTO: 3.3 K/UL (ref 1–4.8)
LYMPHOCYTES NFR BLD: 24.2 % (ref 18–48)
MCH RBC QN AUTO: 28 PG (ref 27–31)
MCHC RBC AUTO-ENTMCNC: 32.7 G/DL (ref 32–36)
MCV RBC AUTO: 86 FL (ref 82–98)
MICROSCOPIC COMMENT: ABNORMAL
MONOCYTES # BLD AUTO: 1.1 K/UL (ref 0.3–1)
MONOCYTES NFR BLD: 8 % (ref 4–15)
NEUTROPHILS # BLD AUTO: 9.1 K/UL (ref 1.8–7.7)
NEUTROPHILS NFR BLD: 66.7 % (ref 38–73)
NITRITE UR QL STRIP: NEGATIVE
NRBC BLD-RTO: 0 /100 WBC
PH UR STRIP: 7 [PH] (ref 5–8)
PLATELET # BLD AUTO: 269 K/UL (ref 150–450)
PMV BLD AUTO: 13 FL (ref 9.2–12.9)
POTASSIUM SERPL-SCNC: 4.2 MMOL/L (ref 3.5–5.1)
PROT SERPL-MCNC: 7.5 G/DL (ref 6–8.4)
PROT UR QL STRIP: ABNORMAL
RBC # BLD AUTO: 4.35 M/UL (ref 4–5.4)
RBC #/AREA URNS HPF: 1 /HPF (ref 0–4)
SODIUM SERPL-SCNC: 135 MMOL/L (ref 136–145)
SP GR UR STRIP: 1.02 (ref 1–1.03)
SQUAMOUS #/AREA URNS HPF: 5 /HPF
URN SPEC COLLECT METH UR: ABNORMAL
UROBILINOGEN UR STRIP-ACNC: ABNORMAL EU/DL
WBC # BLD AUTO: 13.58 K/UL (ref 3.9–12.7)
WBC #/AREA URNS HPF: 6 /HPF (ref 0–5)

## 2023-10-03 PROCEDURE — 85025 COMPLETE CBC W/AUTO DIFF WBC: CPT | Performed by: STUDENT IN AN ORGANIZED HEALTH CARE EDUCATION/TRAINING PROGRAM

## 2023-10-03 PROCEDURE — 25000003 PHARM REV CODE 250: Performed by: STUDENT IN AN ORGANIZED HEALTH CARE EDUCATION/TRAINING PROGRAM

## 2023-10-03 PROCEDURE — 59409 PRA ETRICAL CARE,VAG DELIV ONLY: ICD-10-PCS | Mod: AA,,, | Performed by: ANESTHESIOLOGY

## 2023-10-03 PROCEDURE — 12000002 HC ACUTE/MED SURGE SEMI-PRIVATE ROOM

## 2023-10-03 PROCEDURE — 81001 URINALYSIS AUTO W/SCOPE: CPT | Performed by: STUDENT IN AN ORGANIZED HEALTH CARE EDUCATION/TRAINING PROGRAM

## 2023-10-03 PROCEDURE — 27200710 HC EPIDURAL INFUSION PUMP SET: Performed by: ANESTHESIOLOGY

## 2023-10-03 PROCEDURE — 86592 SYPHILIS TEST NON-TREP QUAL: CPT | Performed by: STUDENT IN AN ORGANIZED HEALTH CARE EDUCATION/TRAINING PROGRAM

## 2023-10-03 PROCEDURE — 63600175 PHARM REV CODE 636 W HCPCS: Performed by: STUDENT IN AN ORGANIZED HEALTH CARE EDUCATION/TRAINING PROGRAM

## 2023-10-03 PROCEDURE — 80053 COMPREHEN METABOLIC PANEL: CPT | Performed by: STUDENT IN AN ORGANIZED HEALTH CARE EDUCATION/TRAINING PROGRAM

## 2023-10-03 PROCEDURE — 63600175 PHARM REV CODE 636 W HCPCS: Performed by: ANESTHESIOLOGY

## 2023-10-03 PROCEDURE — 86901 BLOOD TYPING SEROLOGIC RH(D): CPT | Performed by: STUDENT IN AN ORGANIZED HEALTH CARE EDUCATION/TRAINING PROGRAM

## 2023-10-03 PROCEDURE — C1751 CATH, INF, PER/CENT/MIDLINE: HCPCS | Performed by: ANESTHESIOLOGY

## 2023-10-03 PROCEDURE — 59409 OBSTETRICAL CARE: CPT | Mod: AA,,, | Performed by: ANESTHESIOLOGY

## 2023-10-03 RX ORDER — SODIUM CHLORIDE, SODIUM LACTATE, POTASSIUM CHLORIDE, CALCIUM CHLORIDE 600; 310; 30; 20 MG/100ML; MG/100ML; MG/100ML; MG/100ML
INJECTION, SOLUTION INTRAVENOUS CONTINUOUS
Status: DISCONTINUED | OUTPATIENT
Start: 2023-10-03 | End: 2023-10-04

## 2023-10-03 RX ORDER — BUTORPHANOL TARTRATE 1 MG/ML
1 INJECTION INTRAMUSCULAR; INTRAVENOUS EVERY 4 HOURS PRN
Status: DISCONTINUED | OUTPATIENT
Start: 2023-10-03 | End: 2023-10-04

## 2023-10-03 RX ORDER — OXYTOCIN/RINGER'S LACTATE 30/500 ML
334 PLASTIC BAG, INJECTION (ML) INTRAVENOUS ONCE AS NEEDED
Status: DISCONTINUED | OUTPATIENT
Start: 2023-10-03 | End: 2023-10-04

## 2023-10-03 RX ORDER — FENTANYL/BUPIVACAINE/NS/PF 2MCG/ML-.1
PLASTIC BAG, INJECTION (ML) INJECTION CONTINUOUS
Status: DISCONTINUED | OUTPATIENT
Start: 2023-10-03 | End: 2023-10-04

## 2023-10-03 RX ORDER — CALCIUM CARBONATE 200(500)MG
500 TABLET,CHEWABLE ORAL 3 TIMES DAILY PRN
Status: DISCONTINUED | OUTPATIENT
Start: 2023-10-03 | End: 2023-10-06 | Stop reason: HOSPADM

## 2023-10-03 RX ORDER — ROPIVACAINE HYDROCHLORIDE 2 MG/ML
20 INJECTION, SOLUTION EPIDURAL; INFILTRATION ONCE AS NEEDED
Status: COMPLETED | OUTPATIENT
Start: 2023-10-03 | End: 2023-10-03

## 2023-10-03 RX ORDER — OXYTOCIN/RINGER'S LACTATE 30/500 ML
95 PLASTIC BAG, INJECTION (ML) INTRAVENOUS ONCE AS NEEDED
Status: DISCONTINUED | OUTPATIENT
Start: 2023-10-03 | End: 2023-10-04

## 2023-10-03 RX ORDER — ONDANSETRON 2 MG/ML
4 INJECTION INTRAMUSCULAR; INTRAVENOUS EVERY 6 HOURS PRN
Status: DISCONTINUED | OUTPATIENT
Start: 2023-10-03 | End: 2023-10-04

## 2023-10-03 RX ADMIN — DINOPROSTONE 10 MG: 10 INSERT VAGINAL at 07:10

## 2023-10-03 RX ADMIN — BUTORPHANOL TARTRATE 1 MG: 1 INJECTION, SOLUTION INTRAMUSCULAR; INTRAVENOUS at 08:10

## 2023-10-03 RX ADMIN — PENICILLIN G POTASSIUM 5 MILLION UNITS: 5000000 POWDER, FOR SOLUTION INTRAMUSCULAR; INTRAPLEURAL; INTRATHECAL; INTRAVENOUS at 08:10

## 2023-10-03 RX ADMIN — SODIUM CHLORIDE, SODIUM LACTATE, POTASSIUM CHLORIDE, AND CALCIUM CHLORIDE: .6; .31; .03; .02 INJECTION, SOLUTION INTRAVENOUS at 07:10

## 2023-10-03 RX ADMIN — PROMETHAZINE HYDROCHLORIDE 12.5 MG: 25 INJECTION INTRAMUSCULAR; INTRAVENOUS at 08:10

## 2023-10-03 RX ADMIN — ROPIVACAINE HYDROCHLORIDE 20 ML: 2 INJECTION, SOLUTION EPIDURAL; INFILTRATION at 11:10

## 2023-10-04 LAB
ALBUMIN SERPL BCP-MCNC: 3.2 G/DL (ref 3.5–5.2)
ALP SERPL-CCNC: 149 U/L (ref 55–135)
ALT SERPL W/O P-5'-P-CCNC: 22 U/L (ref 10–44)
AMPHET+METHAMPHET UR QL: NEGATIVE
ANION GAP SERPL CALC-SCNC: 10 MMOL/L (ref 8–16)
AST SERPL-CCNC: 27 U/L (ref 10–40)
BARBITURATES UR QL SCN>200 NG/ML: NEGATIVE
BENZODIAZ UR QL SCN>200 NG/ML: NEGATIVE
BILIRUB SERPL-MCNC: 0.7 MG/DL (ref 0.1–1)
BUN SERPL-MCNC: 9 MG/DL (ref 6–20)
BZE UR QL SCN: NEGATIVE
CALCIUM SERPL-MCNC: 9.1 MG/DL (ref 8.7–10.5)
CANNABINOIDS UR QL SCN: NEGATIVE
CHLORIDE SERPL-SCNC: 107 MMOL/L (ref 95–110)
CO2 SERPL-SCNC: 20 MMOL/L (ref 23–29)
CREAT SERPL-MCNC: 1 MG/DL (ref 0.5–1.4)
CREAT UR-MCNC: 67 MG/DL (ref 15–325)
CREAT UR-MCNC: 70.6 MG/DL (ref 15–325)
ERYTHROCYTE [DISTWIDTH] IN BLOOD BY AUTOMATED COUNT: 13.9 % (ref 11.5–14.5)
EST. GFR  (NO RACE VARIABLE): >60 ML/MIN/1.73 M^2
GLUCOSE SERPL-MCNC: 179 MG/DL (ref 70–110)
GLUCOSE SERPL-MCNC: 70 MG/DL (ref 70–110)
GLUCOSE SERPL-MCNC: 74 MG/DL (ref 70–110)
GLUCOSE SERPL-MCNC: 83 MG/DL (ref 70–110)
GLUCOSE SERPL-MCNC: 84 MG/DL (ref 70–110)
GLUCOSE SERPL-MCNC: 85 MG/DL (ref 70–110)
GLUCOSE SERPL-MCNC: 96 MG/DL (ref 70–110)
HCT VFR BLD AUTO: 39.6 % (ref 37–48.5)
HGB BLD-MCNC: 12.6 G/DL (ref 12–16)
MCH RBC QN AUTO: 28.3 PG (ref 27–31)
MCHC RBC AUTO-ENTMCNC: 31.8 G/DL (ref 32–36)
MCV RBC AUTO: 89 FL (ref 82–98)
OPIATES UR QL SCN: NEGATIVE
PCP UR QL SCN>25 NG/ML: NEGATIVE
PLATELET # BLD AUTO: 219 K/UL (ref 150–450)
PMV BLD AUTO: 12.6 FL (ref 9.2–12.9)
POTASSIUM SERPL-SCNC: 4.3 MMOL/L (ref 3.5–5.1)
PROT SERPL-MCNC: 6.6 G/DL (ref 6–8.4)
PROT UR-MCNC: 24 MG/DL (ref 6–15)
PROT/CREAT UR: 0.36 MG/G{CREAT} (ref 0–0.2)
RBC # BLD AUTO: 4.45 M/UL (ref 4–5.4)
RPR SER QL: NORMAL
SODIUM SERPL-SCNC: 137 MMOL/L (ref 136–145)
TOXICOLOGY INFORMATION: NORMAL
WBC # BLD AUTO: 16.43 K/UL (ref 3.9–12.7)

## 2023-10-04 PROCEDURE — 63600175 PHARM REV CODE 636 W HCPCS: Performed by: ANESTHESIOLOGY

## 2023-10-04 PROCEDURE — 80053 COMPREHEN METABOLIC PANEL: CPT | Performed by: STUDENT IN AN ORGANIZED HEALTH CARE EDUCATION/TRAINING PROGRAM

## 2023-10-04 PROCEDURE — 85027 COMPLETE CBC AUTOMATED: CPT | Performed by: STUDENT IN AN ORGANIZED HEALTH CARE EDUCATION/TRAINING PROGRAM

## 2023-10-04 PROCEDURE — 36415 COLL VENOUS BLD VENIPUNCTURE: CPT | Performed by: STUDENT IN AN ORGANIZED HEALTH CARE EDUCATION/TRAINING PROGRAM

## 2023-10-04 PROCEDURE — 80348 DRUG SCREENING BUPRENORPHINE: CPT | Performed by: STUDENT IN AN ORGANIZED HEALTH CARE EDUCATION/TRAINING PROGRAM

## 2023-10-04 PROCEDURE — 25000003 PHARM REV CODE 250: Performed by: ANESTHESIOLOGY

## 2023-10-04 PROCEDURE — 25000003 PHARM REV CODE 250: Performed by: STUDENT IN AN ORGANIZED HEALTH CARE EDUCATION/TRAINING PROGRAM

## 2023-10-04 PROCEDURE — 12000002 HC ACUTE/MED SURGE SEMI-PRIVATE ROOM

## 2023-10-04 PROCEDURE — 84156 ASSAY OF PROTEIN URINE: CPT | Performed by: STUDENT IN AN ORGANIZED HEALTH CARE EDUCATION/TRAINING PROGRAM

## 2023-10-04 PROCEDURE — 72200005 HC VAGINAL DELIVERY LEVEL II: Performed by: STUDENT IN AN ORGANIZED HEALTH CARE EDUCATION/TRAINING PROGRAM

## 2023-10-04 PROCEDURE — 80307 DRUG TEST PRSMV CHEM ANLYZR: CPT | Performed by: STUDENT IN AN ORGANIZED HEALTH CARE EDUCATION/TRAINING PROGRAM

## 2023-10-04 PROCEDURE — 62326 NJX INTERLAMINAR LMBR/SAC: CPT | Performed by: ANESTHESIOLOGY

## 2023-10-04 PROCEDURE — 63600175 PHARM REV CODE 636 W HCPCS: Performed by: STUDENT IN AN ORGANIZED HEALTH CARE EDUCATION/TRAINING PROGRAM

## 2023-10-04 RX ORDER — OXYCODONE AND ACETAMINOPHEN 10; 325 MG/1; MG/1
1 TABLET ORAL EVERY 4 HOURS PRN
Status: DISCONTINUED | OUTPATIENT
Start: 2023-10-04 | End: 2023-10-06 | Stop reason: HOSPADM

## 2023-10-04 RX ORDER — EPHEDRINE SULFATE 50 MG/ML
5 INJECTION, SOLUTION INTRAVENOUS ONCE AS NEEDED
Status: DISCONTINUED | OUTPATIENT
Start: 2023-10-04 | End: 2023-10-04

## 2023-10-04 RX ORDER — DIPHENHYDRAMINE HCL 25 MG
25 CAPSULE ORAL EVERY 4 HOURS PRN
Status: DISCONTINUED | OUTPATIENT
Start: 2023-10-04 | End: 2023-10-06 | Stop reason: HOSPADM

## 2023-10-04 RX ORDER — DIPHENOXYLATE HYDROCHLORIDE AND ATROPINE SULFATE 2.5; .025 MG/1; MG/1
2 TABLET ORAL EVERY 6 HOURS PRN
Status: DISCONTINUED | OUTPATIENT
Start: 2023-10-04 | End: 2023-10-06 | Stop reason: HOSPADM

## 2023-10-04 RX ORDER — OXYTOCIN/RINGER'S LACTATE 30/500 ML
95 PLASTIC BAG, INJECTION (ML) INTRAVENOUS ONCE
Status: DISCONTINUED | OUTPATIENT
Start: 2023-10-04 | End: 2023-10-06 | Stop reason: HOSPADM

## 2023-10-04 RX ORDER — PRENATAL WITH FERROUS FUM AND FOLIC ACID 3080; 920; 120; 400; 22; 1.84; 3; 20; 10; 1; 12; 200; 27; 25; 2 [IU]/1; [IU]/1; MG/1; [IU]/1; MG/1; MG/1; MG/1; MG/1; MG/1; MG/1; UG/1; MG/1; MG/1; MG/1; MG/1
1 TABLET ORAL DAILY
Status: DISCONTINUED | OUTPATIENT
Start: 2023-10-04 | End: 2023-10-06 | Stop reason: HOSPADM

## 2023-10-04 RX ORDER — ONDANSETRON 4 MG/1
8 TABLET, ORALLY DISINTEGRATING ORAL EVERY 8 HOURS PRN
Status: DISCONTINUED | OUTPATIENT
Start: 2023-10-04 | End: 2023-10-06 | Stop reason: HOSPADM

## 2023-10-04 RX ORDER — ONDANSETRON 2 MG/ML
4 INJECTION INTRAMUSCULAR; INTRAVENOUS ONCE AS NEEDED
Status: DISCONTINUED | OUTPATIENT
Start: 2023-10-04 | End: 2023-10-04

## 2023-10-04 RX ORDER — HYDROCORTISONE 25 MG/G
CREAM TOPICAL 3 TIMES DAILY PRN
Status: DISCONTINUED | OUTPATIENT
Start: 2023-10-04 | End: 2023-10-06 | Stop reason: HOSPADM

## 2023-10-04 RX ORDER — OXYCODONE AND ACETAMINOPHEN 5; 325 MG/1; MG/1
1 TABLET ORAL EVERY 4 HOURS PRN
Status: DISCONTINUED | OUTPATIENT
Start: 2023-10-04 | End: 2023-10-06 | Stop reason: HOSPADM

## 2023-10-04 RX ORDER — DOCUSATE SODIUM 100 MG/1
200 CAPSULE, LIQUID FILLED ORAL 2 TIMES DAILY PRN
Status: DISCONTINUED | OUTPATIENT
Start: 2023-10-04 | End: 2023-10-06 | Stop reason: HOSPADM

## 2023-10-04 RX ORDER — ROPIVACAINE HYDROCHLORIDE 2 MG/ML
INJECTION, SOLUTION EPIDURAL; INFILTRATION
Status: COMPLETED | OUTPATIENT
Start: 2023-10-04 | End: 2023-10-04

## 2023-10-04 RX ORDER — PROCHLORPERAZINE EDISYLATE 5 MG/ML
5 INJECTION INTRAMUSCULAR; INTRAVENOUS EVERY 6 HOURS PRN
Status: DISCONTINUED | OUTPATIENT
Start: 2023-10-04 | End: 2023-10-06 | Stop reason: HOSPADM

## 2023-10-04 RX ORDER — OXYTOCIN/RINGER'S LACTATE 30/500 ML
334 PLASTIC BAG, INJECTION (ML) INTRAVENOUS ONCE AS NEEDED
Status: DISCONTINUED | OUTPATIENT
Start: 2023-10-04 | End: 2023-10-06 | Stop reason: HOSPADM

## 2023-10-04 RX ORDER — LABETALOL 200 MG/1
200 TABLET, FILM COATED ORAL ONCE
Status: COMPLETED | OUTPATIENT
Start: 2023-10-04 | End: 2023-10-04

## 2023-10-04 RX ORDER — OXYTOCIN/RINGER'S LACTATE 30/500 ML
0-30 PLASTIC BAG, INJECTION (ML) INTRAVENOUS CONTINUOUS
Status: DISCONTINUED | OUTPATIENT
Start: 2023-10-04 | End: 2023-10-04

## 2023-10-04 RX ORDER — CARBOPROST TROMETHAMINE 250 UG/ML
250 INJECTION, SOLUTION INTRAMUSCULAR
Status: DISCONTINUED | OUTPATIENT
Start: 2023-10-04 | End: 2023-10-06 | Stop reason: HOSPADM

## 2023-10-04 RX ORDER — TRANEXAMIC ACID 10 MG/ML
1000 INJECTION, SOLUTION INTRAVENOUS EVERY 30 MIN PRN
Status: DISCONTINUED | OUTPATIENT
Start: 2023-10-04 | End: 2023-10-06 | Stop reason: HOSPADM

## 2023-10-04 RX ORDER — SIMETHICONE 80 MG
1 TABLET,CHEWABLE ORAL EVERY 6 HOURS PRN
Status: DISCONTINUED | OUTPATIENT
Start: 2023-10-04 | End: 2023-10-06 | Stop reason: HOSPADM

## 2023-10-04 RX ORDER — MISOPROSTOL 200 UG/1
800 TABLET ORAL ONCE AS NEEDED
Status: DISCONTINUED | OUTPATIENT
Start: 2023-10-04 | End: 2023-10-06 | Stop reason: HOSPADM

## 2023-10-04 RX ORDER — OXYTOCIN/RINGER'S LACTATE 30/500 ML
95 PLASTIC BAG, INJECTION (ML) INTRAVENOUS ONCE AS NEEDED
Status: DISCONTINUED | OUTPATIENT
Start: 2023-10-04 | End: 2023-10-06 | Stop reason: HOSPADM

## 2023-10-04 RX ORDER — FENTANYL/BUPIVACAINE/NS/PF 2MCG/ML-.1
14 PLASTIC BAG, INJECTION (ML) INJECTION CONTINUOUS
Status: DISCONTINUED | OUTPATIENT
Start: 2023-10-04 | End: 2023-10-04

## 2023-10-04 RX ORDER — NALOXONE HCL 0.4 MG/ML
0.4 VIAL (ML) INJECTION SEE ADMIN INSTRUCTIONS
Status: DISCONTINUED | OUTPATIENT
Start: 2023-10-04 | End: 2023-10-04

## 2023-10-04 RX ORDER — OXYTOCIN 10 [USP'U]/ML
10 INJECTION, SOLUTION INTRAMUSCULAR; INTRAVENOUS ONCE AS NEEDED
Status: DISCONTINUED | OUTPATIENT
Start: 2023-10-04 | End: 2023-10-06 | Stop reason: HOSPADM

## 2023-10-04 RX ORDER — DIPHENHYDRAMINE HYDROCHLORIDE 50 MG/ML
12.5 INJECTION INTRAMUSCULAR; INTRAVENOUS EVERY 4 HOURS PRN
Status: DISCONTINUED | OUTPATIENT
Start: 2023-10-04 | End: 2023-10-04

## 2023-10-04 RX ADMIN — IBUPROFEN 600 MG: 400 TABLET ORAL at 05:10

## 2023-10-04 RX ADMIN — DEXTROSE 3 MILLION UNITS: 50 INJECTION, SOLUTION INTRAVENOUS at 12:10

## 2023-10-04 RX ADMIN — DEXTROSE 3 MILLION UNITS: 50 INJECTION, SOLUTION INTRAVENOUS at 08:10

## 2023-10-04 RX ADMIN — DEXTROSE 3 MILLION UNITS: 50 INJECTION, SOLUTION INTRAVENOUS at 04:10

## 2023-10-04 RX ADMIN — Medication 2 MILLI-UNITS/MIN: at 08:10

## 2023-10-04 RX ADMIN — Medication 12 ML: at 12:10

## 2023-10-04 RX ADMIN — LABETALOL HYDROCHLORIDE 200 MG: 200 TABLET, FILM COATED ORAL at 06:10

## 2023-10-04 RX ADMIN — SODIUM CHLORIDE, SODIUM LACTATE, POTASSIUM CHLORIDE, AND CALCIUM CHLORIDE 1000 ML: .6; .31; .03; .02 INJECTION, SOLUTION INTRAVENOUS at 12:10

## 2023-10-04 RX ADMIN — SODIUM CHLORIDE 500 ML: 0.9 INJECTION, SOLUTION INTRAVENOUS at 07:10

## 2023-10-04 RX ADMIN — LABETALOL HYDROCHLORIDE 200 MG: 200 TABLET, FILM COATED ORAL at 01:10

## 2023-10-04 RX ADMIN — ROPIVACAINE HYDROCHLORIDE 10 ML: 2 INJECTION, SOLUTION EPIDURAL; INFILTRATION; PERINEURAL at 11:10

## 2023-10-04 NOTE — NURSING TRANSFER
Nursing Transfer Note             Reason patient is being transferred: postpartum     Transfer To: 2119     Transfer via wheelchair     Transfer with all personal belongings and family     Transported by TOSHIA Keller, rn  Medicines sent:na  Any special needs or follow-up needed: none  Chart send with patient: Yes     Notified: pts family accompanying pt     Patient reassessed at: 1719     Upon arrival to floor: patient oriented to room, call bell in reach and bed in lowest position

## 2023-10-04 NOTE — LACTATION NOTE
This note was copied from a baby's chart.     10/04/23 1340   Maternal Assessment   Breast Size Issue none   Breast Shape pendulous   Breast Density soft   Areola elastic   Nipples everted   Maternal Infant Feeding   Maternal Emotional State assist needed   Infant Positioning clutch/football   Signs of Milk Transfer audible swallow   Pain with Feeding no   Latch Assistance yes     Assisted with position & latch. Instructed mom to compress breast for a deeper latch on. Good nutritive sucking & swallowing noted. Instructed on the signs of an effective feeding.  Discussed positioning, comfortable latch, rhythmic, nutritive sucking, audible swallows, appropriate length of feeding, comfort of latch and evaluating for fullness cues.  Also discussed appropriate output for age.  Mom states understanding and verbalized appropriate recall.

## 2023-10-04 NOTE — ANESTHESIA PROCEDURE NOTES
Epidural    Patient location during procedure: OB   Reason for block: primary anesthetic   Reason for block: labor analgesia requested by patient and obstetrician  Diagnosis: Intrauterine pregnancy   Start time: 10/3/2023 12:04 AM  Timeout: 10/3/2023 11:40 PM  End time: 10/3/2023 11:50 PM    Staffing  Performing Provider: Germán Sahni Jr., MD  Authorizing Provider: Germán Sahni Jr., MD    Staffing  Performed by: Germán Sahni Jr., MD  Authorized by: Germán Sahni Jr., MD        Preanesthetic Checklist  Completed: patient identified, IV checked, site marked, risks and benefits discussed, surgical consent, monitors and equipment checked, pre-op evaluation, timeout performed, anesthesia consent given, hand hygiene performed and patient being monitored  Preparation  Patient position: sitting  Prep: Betadine and ChloraPrep  Patient monitoring: ECG and Blood Pressure  Reason for block: primary anesthetic   Epidural  Skin Anesthetic: lidocaine 1%  Skin Wheal: 3 mL  Administration type: continuous  Approach: midline  Interspace: L3-4    Injection technique: MARGUERITE air  Needle and Epidural Catheter  Needle type: Tuohy   Needle gauge: 17  Needle length: 3.5 inches  Catheter type: springwound and multi-orifice  Catheter size: 19 G  Insertion Attempts: 1  Test dose: 3 mL of lidocaine 1.5% with Epi 1-to-200,000  Additional Documentation: incremental injection, negative aspiration for heme and CSF, no paresthesia on injection, no significant pain on injection, no significant complaints from patient and no signs/symptoms of IV or SA injection  Needle localization: anatomical landmarks  Assessment  Upper dermatomal levels - Left: T6  Right: T6   Dermatomal levels determined by pinch or prick  Ease of block: easy  Patient's tolerance of the procedure: no complaints and comfortable throughout block No inadvertent dural puncture with Tuohy.  Dural puncture not performed with spinal  needle    Medications:    Medications: ropivacaine (NAROPIN) solution 0.2% - Epidural   10 mL - 10/4/2023 11:47:00 PM

## 2023-10-04 NOTE — L&D DELIVERY NOTE
[facility]  Vaginal Delivery   Operative Note    SUMMARY   Patient labored to completion and began to push for spontaneous vaginal delivery.  Fetal head delivered over intact perineum in the TOÑITO position.  No nuchal cord.  With gentle downward traction the anterior shoulder delivered without difficulty followed by the posterior shoulder and remaining body.  Infant was placed on maternal abdomen for skin to skin and bulb suctioned.  Due to delay in cry, cord clamped and cut and infant brought to warmer where he very quickly began to cry.  Placenta appeared somewhat adherent and manually extracted intact without difficulty. IV Pitocin initiated with good uterine tone.  No laceration noted.  The patient tolerated delivery well.  Sponge, needle, and lap count correct. Placenta inspected and noted to be intact.         Indications: IOL for preeclampsia and type 2DM  Pregnancy complicated by:   Patient Active Problem List   Diagnosis    Acute cholecystitis    S/P laparoscopic cholecystectomy    Fever blister    Impetigo     (spontaneous vaginal delivery)     Admitting GA: 37w1d    Delivery Information for Bonifacio Jean    Birth information:  YOB: 2023   Time of birth: 1:01 PM   Sex: male   Head Delivery Date/Time: 10/4/2023  1:01 PM   Delivery type: Vaginal, Spontaneous   Gestational Age: 37w1d        Delivery Providers    Delivering clinician: Jenny Sow MD   Provider Role    Reyna Keller RN Registered Nurse    Sophie Pleitez RN Registered Nurse              Measurements    Weight: 3265 g  Weight (lbs): 7 lb 3.2 oz  Length:          Apgars    Living status: Living  Apgar Component Scores:  1 min.:  5 min.:  10 min.:  15 min.:  20 min.:    Skin color:  0  1       Heart rate:  2  2       Reflex irritability:  2  2       Muscle tone:  2  2       Respiratory effort:  2  2       Total:  8  9       Apgars assigned by: SOPHIE PLEITEZ RN         Operative Delivery     Forceps attempted?: No  Vacuum extractor attempted?: No         Shoulder Dystocia    Shoulder dystocia present?: No           Presentation    Presentation: Vertex  Position: Middle Occiput Anterior           Interventions/Resuscitation    Method: Tactile Stimulation, Bulb Suctioning       Cord    Vessels: 3 vessels  Complications: None  Delayed Cord Clamping?: No  Cord Clamped Date/Time: 10/4/2023  1:02 PM  Cord Blood Disposition: Sent with Baby  Gases Sent?: No  Stem Cell Collection (by MD): No       Placenta    Placenta delivery date/time: 10/4/2023 1306  Placenta removal: Spontaneous  Placenta appearance: Intact  Placenta disposition: Discarded           Labor Events:       labor: No     Labor Onset Date/Time: 10/04/2023 00:10     Dilation Complete Date/Time: 10/04/2023 12:45     Start Pushing Date/Time: 10/04/2023 12:48       Start Pushing Date/Time: 10/04/2023 12:48     Rupture Date/Time: 10/04/23  0641         Rupture type: SRM (Spontaneous Rupture)         Fluid Amount:       Fluid Color: Clear, Pinkish               steroids: None     Antibiotics given for GBS: Yes     Induction: dinoprostone insert     Indications for induction:  Gestational Diabetic;Hypertension     Augmentation: oxytocin     Indications for augmentation: Ineffective Contraction Pattern     Labor complications: None     Additional complications:          Cervical ripening:                     Delivery:      Episiotomy: None     Indication for Episiotomy:       Perineal Lacerations: None Repaired:      Periurethral Laceration:   Repaired:     Labial Laceration:   Repaired:     Sulcus Laceration:   Repaired:     Vaginal Laceration:   Repaired:     Cervical Laceration:   Repaired:     Repair suture:       Repair # of packets: 0     Last Value - EBL - Nursing (mL):       Sum - EBL - Nursing (mL): 0     Last Value - EBL - Anesthesia (mL):      Calculated QBL (mL): 138 mL      Vaginal Sweep Performed: Yes     Surgicount  Correct: Yes       Other providers:       Anesthesia    Method: Epidural          Details (if applicable):  Trial of Labor      Categorization:      Priority:     Indications for :     Incision Type:       Additional  information:  Forceps:    Vacuum:    Breech:    Observed anomalies    Other (Comments):       Jenny Sow MD  Obstetrics and Gynecology  [facility]

## 2023-10-04 NOTE — ANESTHESIA PREPROCEDURE EVALUATION
10/03/2023  Perla Jean is a 33 y.o., female.      Pre-op Assessment    I have reviewed the Patient Summary Reports.     I have reviewed the Nursing Notes. I have reviewed the NPO Status.   I have reviewed the Medications.     Review of Systems  Anesthesia Hx:  No problems with previous Anesthesia  Neg history of prior surgery. Denies Family Hx of Anesthesia complications.   Denies Personal Hx of Anesthesia complications.   Social:  Non-Smoker, No Alcohol Use    Hematology/Oncology:  Hematology Normal   Oncology Normal     EENT/Dental:EENT/Dental Normal   Cardiovascular:  Cardiovascular Normal     Pulmonary:  Pulmonary Normal    Renal/:  Renal/ Normal     Hepatic/GI:  Hepatic/GI Normal    Musculoskeletal:  Musculoskeletal Normal    Neurological:   Seizures    Endocrine:   Diabetes, type 2    Dermatological:  Skin Normal    Psych:  Psychiatric Normal           Physical Exam  General: Well nourished and Alert    Airway:  Mallampati: II   Mouth Opening: Normal  TM Distance: Normal  Tongue: Normal  Neck ROM: Normal ROM    Dental:  Intact    Chest/Lungs:  Clear to auscultation, Normal Respiratory Rate    Heart:  Rate: Normal  Rhythm: Regular Rhythm  Sounds: Normal        Anesthesia Plan  Type of Anesthesia, risks & benefits discussed:    Anesthesia Type: Epidural  Intra-op Monitoring Plan: Standard ASA Monitors  Informed Consent: Informed consent signed with the Patient and all parties understand the risks and agree with anesthesia plan.  All questions answered.   ASA Score: 2    Ready For Surgery From Anesthesia Perspective.     .

## 2023-10-05 LAB
BASOPHILS # BLD AUTO: 0.03 K/UL (ref 0–0.2)
BASOPHILS NFR BLD: 0.2 % (ref 0–1.9)
DIFFERENTIAL METHOD: ABNORMAL
EOSINOPHIL # BLD AUTO: 0.1 K/UL (ref 0–0.5)
EOSINOPHIL NFR BLD: 0.7 % (ref 0–8)
ERYTHROCYTE [DISTWIDTH] IN BLOOD BY AUTOMATED COUNT: 14.3 % (ref 11.5–14.5)
GLUCOSE SERPL-MCNC: 102 MG/DL (ref 70–110)
GLUCOSE SERPL-MCNC: 136 MG/DL (ref 70–110)
HCT VFR BLD AUTO: 33.2 % (ref 37–48.5)
HGB BLD-MCNC: 10.9 G/DL (ref 12–16)
IMM GRANULOCYTES # BLD AUTO: 0.08 K/UL (ref 0–0.04)
IMM GRANULOCYTES NFR BLD AUTO: 0.6 % (ref 0–0.5)
LYMPHOCYTES # BLD AUTO: 4.5 K/UL (ref 1–4.8)
LYMPHOCYTES NFR BLD: 32.1 % (ref 18–48)
MCH RBC QN AUTO: 28.5 PG (ref 27–31)
MCHC RBC AUTO-ENTMCNC: 32.8 G/DL (ref 32–36)
MCV RBC AUTO: 87 FL (ref 82–98)
MONOCYTES # BLD AUTO: 1.1 K/UL (ref 0.3–1)
MONOCYTES NFR BLD: 8.2 % (ref 4–15)
NEUTROPHILS # BLD AUTO: 8.1 K/UL (ref 1.8–7.7)
NEUTROPHILS NFR BLD: 58.2 % (ref 38–73)
NRBC BLD-RTO: 0 /100 WBC
PLATELET # BLD AUTO: 243 K/UL (ref 150–450)
PMV BLD AUTO: 12.9 FL (ref 9.2–12.9)
RBC # BLD AUTO: 3.83 M/UL (ref 4–5.4)
WBC # BLD AUTO: 13.94 K/UL (ref 3.9–12.7)

## 2023-10-05 PROCEDURE — 85025 COMPLETE CBC W/AUTO DIFF WBC: CPT | Performed by: STUDENT IN AN ORGANIZED HEALTH CARE EDUCATION/TRAINING PROGRAM

## 2023-10-05 PROCEDURE — 36415 COLL VENOUS BLD VENIPUNCTURE: CPT | Performed by: STUDENT IN AN ORGANIZED HEALTH CARE EDUCATION/TRAINING PROGRAM

## 2023-10-05 PROCEDURE — 12000002 HC ACUTE/MED SURGE SEMI-PRIVATE ROOM

## 2023-10-05 PROCEDURE — 25000003 PHARM REV CODE 250: Performed by: STUDENT IN AN ORGANIZED HEALTH CARE EDUCATION/TRAINING PROGRAM

## 2023-10-05 RX ORDER — METFORMIN HYDROCHLORIDE 500 MG/1
500 TABLET ORAL
Status: DISCONTINUED | OUTPATIENT
Start: 2023-10-05 | End: 2023-10-06 | Stop reason: HOSPADM

## 2023-10-05 RX ADMIN — OXYCODONE AND ACETAMINOPHEN 1 TABLET: 325; 5 TABLET ORAL at 08:10

## 2023-10-05 RX ADMIN — IBUPROFEN 600 MG: 400 TABLET ORAL at 01:10

## 2023-10-05 RX ADMIN — IBUPROFEN 600 MG: 400 TABLET ORAL at 04:10

## 2023-10-05 RX ADMIN — METFORMIN HYDROCHLORIDE 500 MG: 500 TABLET ORAL at 07:10

## 2023-10-05 RX ADMIN — BENZOCAINE AND LEVOMENTHOL: 200; 5 SPRAY TOPICAL at 01:10

## 2023-10-05 RX ADMIN — Medication: at 03:10

## 2023-10-05 RX ADMIN — IBUPROFEN 600 MG: 400 TABLET ORAL at 10:10

## 2023-10-05 NOTE — ANESTHESIA POSTPROCEDURE EVALUATION
Anesthesia Post Evaluation    Patient: Perla Jean    Procedure(s) Performed: * No procedures listed *    Final Anesthesia Type: epidural      Patient location: Postpartum.  Patient participation: Yes- Able to Participate  Level of consciousness: awake and alert  Post-procedure vital signs: reviewed and stable  Pain management: adequate  Airway patency: patent    PONV status at discharge: No PONV  Anesthetic complications: no      Cardiovascular status: blood pressure returned to baseline and stable  Respiratory status: unassisted and room air  Hydration status: euvolemic  Follow-up not needed.  Comments: Postop day #1. Patient status post vaginal delivery with labor epidural. Patient is doing well this morning. She has no neurological deficits. She has no headache. She had no back pain, and her epidural site is clean without signs of infection. No more follow-up needed from an anesthesia standpoint.              Vitals Value Taken Time   /60 10/05/23 0701   Temp 36.7 °C (98 °F) 10/05/23 0701   Pulse 76 10/05/23 0701   Resp 18 10/05/23 0809   SpO2 100 % 10/05/23 0701         No case tracking events are documented in the log.      Pain/Reinaldo Score: Pain Rating Prior to Med Admin: 4 (10/5/2023  8:09 AM)  Pain Rating Post Med Admin: 0 (10/5/2023  9:09 AM)  Reinaldo Score: 10 (10/4/2023  5:17 PM)

## 2023-10-05 NOTE — PROGRESS NOTES
Vaginal Delivery Postpartum Day 1     Perla Jean is doing well without complaints. Pain well controlled. Tolerating regular diet. Ambulating and voiding without difficulty. She denies any problems with pp blues. States bleeding is not heavy.     OBJECTIVE:     Vitals:    10/04/23 1911 10/04/23 2301 10/05/23 0401 10/05/23 0809   BP: 129/79 110/70 115/76    BP Location: Right arm Right arm Right arm    Patient Position: Lying Lying Lying    Pulse: 88 76 70    Resp: 18 18 18 18   Temp: 99.5 °F (37.5 °C) 98.5 °F (36.9 °C) 98 °F (36.7 °C)    TempSrc: Oral Oral Oral    SpO2: 97% 96% 96%    Weight:       Height:            I & O (Last 24H):  Intake/Output Summary (Last 24 hours)    Intake/Output Summary (Last 24 hours) at 10/5/2023 0833  Last data filed at 10/5/2023 0330  Gross per 24 hour   Intake 1375 ml   Output 2238 ml   Net -863 ml         Physical Exam:  General: NAD, AAO   CV: Regular rate   Resp:   Nonlabored respirations   Abdomen: Soft, appropriately tender    Fundus: Firm   Lochia:  Appropriate   DVT Evaluation: No evidence of DVT on either side seen on physical exam.  No calf tenderness     Labs:  CBC:   Lab Results   Component Value Date/Time    WBC 16.43 (H) 10/04/2023 02:12 PM    RBC 4.45 10/04/2023 02:12 PM    HGB 12.6 10/04/2023 02:12 PM    HCT 39.6 10/04/2023 02:12 PM     10/04/2023 02:12 PM    MCV 89 10/04/2023 02:12 PM    MCH 28.3 10/04/2023 02:12 PM    MCHC 31.8 (L) 10/04/2023 02:12 PM       ABO/Rh  AB POS      ASSESSMENT/PLAN:     S/p vaginal delivery, PPD# 1. Doing well.   Patient Active Problem List   Diagnosis    Acute cholecystitis    S/P laparoscopic cholecystectomy    Fever blister    Impetigo     (spontaneous vaginal delivery)        Routine postpartal care   BPs appropriate. Will wean Labetalol if able  Continue diabetic diet and accuchecks. Continue Metformin  Baby doing well  Dispo: anticipate discharge PPD2 if meeting all criteria    Jenny Sow MD  Obstetrics and  Gynecology  [facility]

## 2023-10-05 NOTE — PLAN OF CARE
[facility]  Discharge Assessment    Primary Care Provider: Shreya Munoz NP   OB Screen completed and no needs identified at this time.    OB Screen (most recent)       OB Screen - 10/05/23 0758          OB SCREEN    Assessment Type Discharge Planning Assessment     Source of Information patient     Received Prenatal Care Yes     Is this a teen pregnancy No     Is the baby in NICU No     Indication for adoption/[facility] Haven No     Indication for DME/post-acute needs No     HIV (+) No     Any congenital  disorders No     Fetal demise/ death No

## 2023-10-06 VITALS
DIASTOLIC BLOOD PRESSURE: 85 MMHG | BODY MASS INDEX: 44.39 KG/M2 | RESPIRATION RATE: 17 BRPM | WEIGHT: 260 LBS | HEIGHT: 64 IN | SYSTOLIC BLOOD PRESSURE: 142 MMHG | TEMPERATURE: 98 F | HEART RATE: 80 BPM | OXYGEN SATURATION: 100 %

## 2023-10-06 LAB
GLUCOSE SERPL-MCNC: 109 MG/DL (ref 70–110)
GLUCOSE SERPL-MCNC: 89 MG/DL (ref 70–110)

## 2023-10-06 PROCEDURE — 25000003 PHARM REV CODE 250: Performed by: STUDENT IN AN ORGANIZED HEALTH CARE EDUCATION/TRAINING PROGRAM

## 2023-10-06 RX ORDER — NEBULIZER AND COMPRESSOR
1 EACH MISCELLANEOUS 2 TIMES DAILY
Qty: 1 EACH | Refills: 0 | Status: SHIPPED | OUTPATIENT
Start: 2023-10-06

## 2023-10-06 RX ORDER — LABETALOL 100 MG/1
100 TABLET, FILM COATED ORAL EVERY 12 HOURS
Qty: 60 TABLET | Refills: 1 | Status: SHIPPED | OUTPATIENT
Start: 2023-10-06 | End: 2023-12-05

## 2023-10-06 RX ORDER — OXYCODONE AND ACETAMINOPHEN 5; 325 MG/1; MG/1
1 TABLET ORAL EVERY 4 HOURS PRN
Qty: 6 TABLET | Refills: 0 | Status: SHIPPED | OUTPATIENT
Start: 2023-10-06

## 2023-10-06 RX ORDER — IBUPROFEN 600 MG/1
600 TABLET ORAL EVERY 6 HOURS PRN
Qty: 60 TABLET | Refills: 0 | Status: SHIPPED | OUTPATIENT
Start: 2023-10-06

## 2023-10-06 RX ORDER — MEDROXYPROGESTERONE ACETATE 150 MG/ML
150 INJECTION, SUSPENSION INTRAMUSCULAR ONCE
Status: COMPLETED | OUTPATIENT
Start: 2023-10-06 | End: 2023-10-06

## 2023-10-06 RX ORDER — LABETALOL 100 MG/1
100 TABLET, FILM COATED ORAL ONCE
Status: COMPLETED | OUTPATIENT
Start: 2023-10-06 | End: 2023-10-06

## 2023-10-06 RX ADMIN — METFORMIN HYDROCHLORIDE 500 MG: 500 TABLET ORAL at 07:10

## 2023-10-06 RX ADMIN — LABETALOL HYDROCHLORIDE 100 MG: 100 TABLET, FILM COATED ORAL at 07:10

## 2023-10-06 RX ADMIN — MEDROXYPROGESTERONE ACETATE 150 MG: 150 INJECTION, SUSPENSION INTRAMUSCULAR at 11:10

## 2023-10-06 NOTE — PLAN OF CARE
10/06/23 0916   Final Note   Assessment Type Final Discharge Note   Anticipated Discharge Disposition Home   What phone number can be called within the next 1-3 days to see how you are doing after discharge? [phone]   Post-Acute Status   Discharge Delays None known at this time     Patient cleared for discharge from case management standpoint.  Chart and discharge orders reviewed.  Patient discharged home with no further case management needs.

## 2023-10-06 NOTE — PLAN OF CARE
V/S WNL; BP treated with labetalol 100 mg. Plan to monitor BP at home and continue labetalol 100 mg twice a day  Fundus firm and midline, below umbilicus with scant lochia  Pain controlled with motrin and percocet as needed  Perla is ambulating independently, voiding and tolerating a regular diet  She is bottle feeding and bonding with baby  Fasting and postprandial blood sugars WNL; restarted on metformin 500 mg daily

## 2023-10-06 NOTE — DISCHARGE SUMMARY
[facility]  Obstetrics  Discharge Summary      Patient Name: Perla Jean  MRN: [id number]  Admission Date: 10/3/2023  Hospital Length of Stay: 3 days  Discharge Date and Time:  10/06/2023 8:20 AM  Attending Physician: Jenny Sow,*   Discharging Provider: Jenny Sow MD, MD   Primary Care Provider: Shreya Munoz NP    Hospital Course:   33-year-old G2 P 0101 at 37 weeks and 1 day presented for induction of labor for type 2 diabetes and preeclampsia (no severe features).  Patient underwent an uncomplicated vaginal delivery, see delivery note.  Mother and infant recovered well.  Her Accu-Cheks were at goal and she was able to return to her pre pregnancy dosing of 500 mg metformin q.day. blood pressures improved to normotensive on postpartum day 1, however on postpartum day 2 began to increase into the mild range.  Will continue labetalol 100 mg p.o. b.i.d. with close follow-up with me in the office next week.  Precautions reviewed.    On postpartum day 2 she was meeting all discharge criteria. Pain well controlled, tolerating regular diet, ambulating and voiding without difficulty, passing flatus, no heavy bleeding. VSS and exam reassuring.  Precautions reviewed with patient and she will follow up with me.  To receive Depo-Provera for contraception prior to discharge.    Vitals:    10/05/23 2323 10/06/23 0336 10/06/23 0727 10/06/23 0747   BP: (!) 146/82 132/66 (!) 139/91 (!) 139/91   BP Location: Right arm Right arm Left arm    Patient Position: Lying Lying Lying    Pulse: 61 66 (!) 59    Resp: 18 18 17    Temp: 98.4 °F (36.9 °C) 98.3 °F (36.8 °C) 98.3 °F (36.8 °C)    TempSrc: Oral Oral Oral    SpO2: 100% 100% 100%    Weight:       Height:            Exam:  NAD, AAO  Regular rate  Nonlabored respirations  Ab: fundus firm below the umbilicus, appropriate abdominal tenderness  : appropriate lochia  Extremities:  Nontender calves, no evidence of DVT           Final Active  "Diagnoses:    Diagnosis Date Noted POA    PRINCIPAL PROBLEM:   (spontaneous vaginal delivery) [O80] 10/04/2023 Not Applicable      Problems Resolved During this Admission:        Significant Diagnostic Studies: Labs:   CBC   Recent Labs   Lab 10/04/23  1412 10/05/23  0510   WBC 16.43* 13.94*   HGB 12.6 10.9*   HCT 39.6 33.2*    243     Lab Results   Component Value Date    GROUPTRH AB POS 10/03/2023         Feeding Method: both breast and bottle    Immunizations       Date Immunization Status Dose Route/Site Given by    10/04/23 1506 MMR Incomplete 0.5 mL Subcutaneous/     10/04/23 1506 Tdap Incomplete 0.5 mL Intramuscular/             Delivery:    Episiotomy: None   Lacerations: None   Repair suture:     Repair # of packets: 0   Blood loss (ml):       Birth information:  YOB: 2023   Time of birth: 1:01 PM   Sex: male   Delivery type: Vaginal, Spontaneous   Gestational Age: 37w1d     Measurements    Weight: 3265 g  Weight (lbs): 7 lb 3.2 oz  Length: 48.9 cm  Length (in): 19.25"         Delivery Clinician: Delivery Providers    Delivering clinician: Jenny Sow MD   Provider Role    Reyna Keller RN Registered Nurse    Sophie Dyson RN Registered Nurse             Additional  information:  Forceps:    Vacuum:    Breech:    Observed anomalies      Living?:     Apgars    Living status: Living  Apgar Component Scores:  1 min.:  5 min.:  10 min.:  15 min.:  20 min.:    Skin color:  0  1       Heart rate:  2  2       Reflex irritability:  2  2       Muscle tone:  2  2       Respiratory effort:  2  2       Total:  8  9       Apgars assigned by: SOPHIE DYSON RN         Placenta: Delivered:       appearance    Pending Diagnostic Studies:       None            Discharged Condition: good    Disposition: Home or Self Care    Follow Up:   Follow-up Information       Jenny Sow MD Follow up in 1 week(s).    Specialty: Obstetrics and Gynecology  Why: Postpartum check, BP " check  Contact information:  [place] [address]  [phone]                           Patient Instructions:      Diet Adult Regular     Pelvic Rest     Notify your health care provider if you experience any of the following:  temperature >100.4     Notify your health care provider if you experience any of the following:  persistent nausea and vomiting or diarrhea     Notify your health care provider if you experience any of the following:  severe uncontrolled pain     Notify your health care provider if you experience any of the following:  difficulty breathing or increased cough     Notify your health care provider if you experience any of the following:  severe persistent headache     Notify your health care provider if you experience any of the following:  worsening rash     Notify your health care provider if you experience any of the following:  persistent dizziness, light-headedness, or visual disturbances     Notify your health care provider if you experience any of the following:  increased confusion or weakness     Activity as tolerated     Medications:  Current Discharge Medication List        START taking these medications    Details   ibuprofen (ADVIL,MOTRIN) 600 MG tablet Take 1 tablet (600 mg total) by mouth every 6 (six) hours as needed (cramping).  Qty: 60 tablet, Refills: 0      labetaloL (NORMODYNE) 100 MG tablet Take 1 tablet (100 mg total) by mouth every 12 (twelve) hours.  Qty: 60 tablet, Refills: 1    Comments: .      oxyCODONE-acetaminophen (PERCOCET) 5-325 mg per tablet Take 1 tablet by mouth every 4 (four) hours as needed for Pain.  Qty: 6 tablet, Refills: 0    Comments: Quantity prescribed more than 7 day supply? No           CONTINUE these medications which have NOT CHANGED    Details   metFORMIN (GLUCOPHAGE-XR) 500 MG ER 24hr tablet Take 1 tablet (500 mg total) by mouth daily with breakfast.  Qty: 90 tablet, Refills: 1    Associated Diagnoses: Type 2 diabetes mellitus  without complication, without long-term current use of insulin           STOP taking these medications       acetaminophen (TYLENOL) 500 MG tablet Comments:   Reason for Stopping:         prenatal no118-iron-folic acid 29 mg iron- 1 mg Chew Comments:   Reason for Stopping:         pyridoxine, vitamin B6, (B-6) 100 MG Tab Comments:   Reason for Stopping:         semaglutide (OZEMPIC) 0.25 mg or 0.5 mg(2 mg/1.5 mL) pen injector Comments:   Reason for Stopping:               Jenny Sow MD, MD  Obstetrics  [facility]

## 2023-10-06 NOTE — DISCHARGE INSTRUCTIONS
Pelvic rest for 6 weeks (no sex, tampons, douching, nothing in the vagina)    You can experience vaginal bleeding on and off for up to 6 weeks, it will gradually get lighter and the color will change from bright red to a brownish discharge towards the end.    Activity:  NO strenuous activities or exercising.  Do not /lift anything over 15 pounds.   Do not do heavy housework or cleaning.    NO driving for 3 days.  You may take short car trips but do not drive.    You may shower and/or soak in a bathtub, both are acceptable.  Use a mild soap, no heavy perfumes or fragrances to avoid irritation.     If constipation develops:  You may take Colace (stool softener), Milk of Magnesia, Dulcolax or Miralax.  All of these medications are sold over the counter.      Pain Relief:  You may take Motrin for mild pain & uterine cramping.      Emotional Changes:  You may experience baby blues after delivery.  You may feel let down, anxious and cry easily.  This is normal.  These feelings can begin 2-3 days after delivery and usually disappear in about a week or two.  Prolonged sadness may indicate postpartum depression.      Call your doctor for any of the following:  Difficulty breathing, problems with any of your medications, inability to eat.    Foul smelling vaginal discharge.  Temperature above 100.4.    Heavy vaginal bleeding.  All women bleed different after delivery and each delivery is different.  Heavy bleeding consists of saturating a mk pad in a 1 hour time period.  Passing clots are normal, if you pass a blood clot larger than the size of a golf ball call your doctor's office.   If you experience pain in your legs/calves, if one leg increases in size and becomes swollen or becomes hot to touch or discolored.   Crying or periods of sadness beyond 2 weeks.      If you are not breastfeeding:  Wear a tight bra and do not stimulate your breasts.  Avoid handling your breasts and do not express milk.  You may apply  ice packs or cabbage leaves to relieve discomfort from engorgement.  If your breasts become warm to touch, reddened or lumps develop call your doctor.

## 2023-10-06 NOTE — NURSING
Vaginal delivery discharge instructions given to Perla, verbalizes understanding. Questions answered, no further questions. Prescriptions to be brought to room by pharmacy.

## 2023-10-06 NOTE — PLAN OF CARE
Problem: Adult Inpatient Plan of Care  Goal: Plan of Care Review  Outcome: Ongoing, Progressing  Goal: Patient-Specific Goal (Individualized)  Outcome: Ongoing, Progressing  Goal: Absence of Hospital-Acquired Illness or Injury  Outcome: Ongoing, Progressing  Goal: Optimal Comfort and Wellbeing  Outcome: Ongoing, Progressing  Goal: Readiness for Transition of Care  Outcome: Ongoing, Progressing     Problem: Bariatric Environmental Safety  Goal: Safety Maintained with Care  Outcome: Ongoing, Progressing     Problem:  Fall Injury Risk  Goal: Absence of Fall, Infant Drop and Related Injury  Outcome: Ongoing, Progressing     Problem: Infection  Goal: Absence of Infection Signs and Symptoms  Outcome: Ongoing, Progressing     Problem: Skin Injury Risk Increased  Goal: Skin Health and Integrity  Outcome: Ongoing, Progressing     Problem: Breastfeeding  Goal: Effective Breastfeeding  Outcome: Ongoing, Progressing

## 2024-07-15 ENCOUNTER — OFFICE VISIT (OUTPATIENT)
Dept: FAMILY MEDICINE | Facility: CLINIC | Age: 34
End: 2024-07-15
Payer: MEDICAID

## 2024-07-15 VITALS
BODY MASS INDEX: 42.17 KG/M2 | HEIGHT: 64 IN | WEIGHT: 247 LBS | OXYGEN SATURATION: 99 % | SYSTOLIC BLOOD PRESSURE: 134 MMHG | HEART RATE: 105 BPM | DIASTOLIC BLOOD PRESSURE: 86 MMHG

## 2024-07-15 DIAGNOSIS — E11.9 TYPE 2 DIABETES MELLITUS WITHOUT COMPLICATION, WITHOUT LONG-TERM CURRENT USE OF INSULIN: ICD-10-CM

## 2024-07-15 DIAGNOSIS — E66.01 MORBID OBESITY DUE TO EXCESS CALORIES: ICD-10-CM

## 2024-07-15 DIAGNOSIS — G40.909 NONINTRACTABLE EPILEPSY WITHOUT STATUS EPILEPTICUS, UNSPECIFIED EPILEPSY TYPE: ICD-10-CM

## 2024-07-15 DIAGNOSIS — Z00.00 ANNUAL PHYSICAL EXAM: Primary | ICD-10-CM

## 2024-07-15 LAB — HBA1C MFR BLD: 6.9 %

## 2024-07-15 PROCEDURE — 3079F DIAST BP 80-89 MM HG: CPT | Mod: CPTII,S$GLB,, | Performed by: NURSE PRACTITIONER

## 2024-07-15 PROCEDURE — 3075F SYST BP GE 130 - 139MM HG: CPT | Mod: CPTII,S$GLB,, | Performed by: NURSE PRACTITIONER

## 2024-07-15 PROCEDURE — 83036 HEMOGLOBIN GLYCOSYLATED A1C: CPT | Mod: QW,,, | Performed by: NURSE PRACTITIONER

## 2024-07-15 PROCEDURE — 3044F HG A1C LEVEL LT 7.0%: CPT | Mod: CPTII,S$GLB,, | Performed by: NURSE PRACTITIONER

## 2024-07-15 PROCEDURE — 3008F BODY MASS INDEX DOCD: CPT | Mod: CPTII,S$GLB,, | Performed by: NURSE PRACTITIONER

## 2024-07-15 PROCEDURE — 1159F MED LIST DOCD IN RCRD: CPT | Mod: CPTII,S$GLB,, | Performed by: NURSE PRACTITIONER

## 2024-07-15 PROCEDURE — 1160F RVW MEDS BY RX/DR IN RCRD: CPT | Mod: CPTII,S$GLB,, | Performed by: NURSE PRACTITIONER

## 2024-07-15 PROCEDURE — 99214 OFFICE O/P EST MOD 30 MIN: CPT | Mod: S$GLB,,, | Performed by: NURSE PRACTITIONER

## 2024-07-15 RX ORDER — METFORMIN HYDROCHLORIDE 500 MG/1
500 TABLET, EXTENDED RELEASE ORAL
Qty: 90 TABLET | Refills: 3 | Status: SHIPPED | OUTPATIENT
Start: 2024-07-15

## 2024-07-15 RX ORDER — SEMAGLUTIDE 0.68 MG/ML
INJECTION, SOLUTION SUBCUTANEOUS
Qty: 1 EACH | Refills: 5 | Status: SHIPPED | OUTPATIENT
Start: 2024-07-15

## 2024-07-15 NOTE — PATIENT INSTRUCTIONS
Start ozempic 0.25mg weekly injection for 4 weeks then increase to 0.5mg weekly injection    Have fasting bloodwork drawn 1 week before next visit

## 2024-07-15 NOTE — PROGRESS NOTES
"  SUBJECTIVE:    Patient ID: Perla Jean is a 33 y.o. female.    Chief Complaint: Diabetes (No bottles//Pt is here for a check up//Pt would like to discuss Ozempic prescription//Eye exam done last year, [facility] 20[facility]//Referral for neuro//Foot exam ordered today//VICENTE)    Pt here for annual physical and DM f/u- last seen here in 9/2022    Pt reports overall doing okay. Had healthy pregnancy and delivered baby boy in Oct 2023- denies any issues during/after pregnancy with DM. Has been on metformin 500mg daily but asking about starting ozempic to help with wt loss. Doesn't really follow diabetic diet    Pt reports hx of epilepsy as a child, "gets the shakes"- can't really tell me the last time she had a seizure, states "I don't know when I'm catching one" but would like to see neurology since it's been years since she last saw neurology. Denies any syncopal episodes.    [facility]        Office Visit on 07/15/2024   Component Date Value Ref Range Status    Hemoglobin A1C, POC 07/15/2024 6.9  % Final       Past Medical History:   Diagnosis Date    Diabetes mellitus, type 2     Epilepsy     in childhood     Past Surgical History:   Procedure Laterality Date    CHOLECYSTECTOMY       Family History   Problem Relation Name Age of Onset    Asthma Mother Noelle Garcia     Arthritis Mother Noelle Garcia     No Known Problems Father         Tests to Keep You Healthy    Eye Exam: DUE  Cervical Cancer Screening: DUE  Last HbA1c < 8 (07/15/2024): Yes      The CVD Risk score (D'Agostino, et al., 2008) failed to calculate for the following reasons:    Cannot find a previous HDL lab    Cannot find a previous total cholesterol lab     Marital Status: Single  Alcohol History:  reports no history of alcohol use.  Tobacco History:  reports that she has never smoked. She has never used smokeless tobacco.  Drug History:  reports no history of drug use.    Health Maintenance Topics with due " status: Not Due       Topic Last Completion Date    Influenza Vaccine 12/12/2022    Foot Exam 07/15/2024    Hemoglobin A1c 07/15/2024     Immunization History   Administered Date(s) Administered    COVID-19, MRNA, LN-S, PF (MODERNA FULL 0.5 ML DOSE) 05/04/2021, 06/01/2021    HPV Quadrivalent 01/24/2008    Hepatitis B 05/10/1999, 05/29/2001    Hepatitis B, Pediatric/Adolescent 11/02/2007, 01/24/2008    Influenza - Quadrivalent 12/18/2015    MMR 11/02/2007    Meningococcal Conjugate (MCV4P) 11/02/2007    PPD Test 05/29/2001    Tdap 11/02/2007    Varicella 01/24/2008       Review of patient's allergies indicates:  No Known Allergies    Current Outpatient Medications:     BLOOD PRESSURE CUFF Misc, 1 Units by Misc.(Non-Drug; Combo Route) route 2 (two) times a day., Disp: 1 each, Rfl: 0    ibuprofen (ADVIL,MOTRIN) 600 MG tablet, Take 1 tablet (600 mg total) by mouth every 6 (six) hours as needed (cramping)., Disp: 60 tablet, Rfl: 0    metFORMIN (GLUCOPHAGE-XR) 500 MG ER 24hr tablet, Take 1 tablet (500 mg total) by mouth daily with breakfast., Disp: 90 tablet, Rfl: 3    semaglutide (OZEMPIC) 0.25 mg or 0.5 mg (2 mg/3 mL) pen injector, Start 0.25mg weekly injection for 4 weeks and then increase to 0.5mg weekly, Disp: 1 each, Rfl: 5    Review of Systems   Constitutional:  Negative for appetite change, chills, fever and unexpected weight change.   HENT:  Negative for sore throat and trouble swallowing.    Eyes:  Negative for visual disturbance.   Respiratory:  Negative for cough, shortness of breath and wheezing.    Cardiovascular:  Negative for chest pain, palpitations and leg swelling.   Gastrointestinal:  Negative for abdominal pain, constipation and diarrhea.   Endocrine: Negative for polydipsia, polyphagia and polyuria.   Genitourinary:  Negative for dysuria, flank pain, hematuria, menstrual problem and urgency.   Musculoskeletal:  Negative for back pain.   Skin:  Negative for rash.   Neurological:  Negative for  "dizziness, seizures ("shakes" occasionally but none recently), syncope, numbness and headaches.   Psychiatric/Behavioral:  Negative for dysphoric mood. The patient is not nervous/anxious.           Objective:      Vitals:    07/15/24 1116   BP: 134/86   Pulse: 105   SpO2: 99%   Weight: 112 kg (247 lb)   Height: 5' 4" (1.626 m)     Physical Exam  Vitals and nursing note reviewed.   Constitutional:       General: She is not in acute distress.     Appearance: She is well-developed. She is obese.   HENT:      Head: Normocephalic and atraumatic.      Right Ear: Tympanic membrane and ear canal normal.      Left Ear: Tympanic membrane and ear canal normal.   Neck:      Vascular: No carotid bruit.   Cardiovascular:      Rate and Rhythm: Normal rate and regular rhythm.      Pulses:           Dorsalis pedis pulses are 2+ on the right side and 2+ on the left side.      Heart sounds: No murmur heard.     No friction rub. No gallop.   Pulmonary:      Effort: Pulmonary effort is normal. No respiratory distress.      Breath sounds: Normal breath sounds. No wheezing or rales.   Abdominal:      General: There is no distension.      Palpations: Abdomen is soft.      Tenderness: There is no abdominal tenderness.   Musculoskeletal:      Cervical back: Neck supple.      Right lower leg: No edema.      Left lower leg: No edema.      Right foot: No deformity.      Left foot: No deformity.   Feet:      Right foot:      Protective Sensation: 5 sites tested.  5 sites sensed.      Skin integrity: No ulcer, erythema or callus.      Toenail Condition: Right toenails are normal.      Left foot:      Protective Sensation: 5 sites tested.  5 sites sensed.      Skin integrity: No ulcer, erythema or callus.      Toenail Condition: Left toenails are normal.   Lymphadenopathy:      Cervical: No cervical adenopathy.   Skin:     General: Skin is warm and dry.      Findings: No rash.   Neurological:      General: No focal deficit present.      Mental " Status: She is alert and oriented to person, place, and time.      Gait: Gait normal.   Psychiatric:         Mood and Affect: Mood normal.           Assessment:       1. Annual physical exam    2. Type 2 diabetes mellitus without complication, without long-term current use of insulin    3. Morbid obesity due to excess calories    4. Nonintractable epilepsy without status epilepticus, unspecified epilepsy type           Plan:       1. Annual physical exam    2. Type 2 diabetes mellitus without complication, without long-term current use of insulin  -A1C up to 6.9%. discussed imp of diabetic diet and will add ozempic. Discussed side effects and cautioned to call with problems. Needs to have updated labs before next visit  -     Hemoglobin A1C, POCT  -      DIABETES FOOT EXAM  -     semaglutide (OZEMPIC) 0.25 mg or 0.5 mg (2 mg/3 mL) pen injector; Start 0.25mg weekly injection for 4 weeks and then increase to 0.5mg weekly  Dispense: 1 each; Refill: 5  -     CBC Auto Differential; Future; Expected date: 07/15/2024  -     Comprehensive Metabolic Panel; Future; Expected date: 07/15/2024  -     Hemoglobin A1C; Future; Expected date: 07/15/2024  -     Lipid Panel; Future; Expected date: 07/15/2024  -     Urinalysis, Reflex to Urine Culture Urine, Clean Catch; Future; Expected date: 07/15/2024  -     Microalbumin/Creatinine Ratio, Urine; Future; Expected date: 07/15/2024  -     TSH w/reflex to FT4; Future; Expected date: 07/15/2024  -     metFORMIN (GLUCOPHAGE-XR) 500 MG ER 24hr tablet; Take 1 tablet (500 mg total) by mouth daily with breakfast.  Dispense: 90 tablet; Refill: 3    3. Morbid obesity due to excess calories  -add ozempic though cautioned needs to make healthy food choices and also recommend regular exercise  -     semaglutide (OZEMPIC) 0.25 mg or 0.5 mg (2 mg/3 mL) pen injector; Start 0.25mg weekly injection for 4 weeks and then increase to 0.5mg weekly  Dispense: 1 each; Refill: 5    4. Nonintractable  "epilepsy without status epilepticus, unspecified epilepsy type  -pt requesting neuro referral, hx of epilepsy as a child but unable to tell me if she's had any recent seizure episodes "shakes occasionally" but denies LOC or syncope  -     Ambulatory referral/consult to Neurology; Future; Expected date: 07/22/2024      Follow up in about 3 months (around 10/15/2024) for Diabetes, Labs before next visit.          Counseled on age and gender appropriate medical preventative services, including cancer screenings, immunizations, overall nutritional health, need for a consistent exercise regimen and an overall push towards maintaining a vigorous and active lifestyle.      7/15/2024 Shreya Munoz NP      "

## 2024-07-16 ENCOUNTER — PATIENT OUTREACH (OUTPATIENT)
Dept: ADMINISTRATIVE | Facility: HOSPITAL | Age: 34
End: 2024-07-16
Payer: MEDICAID

## 2024-07-16 NOTE — LETTER
AUTHORIZATION FOR RELEASE OF   CONFIDENTIAL INFORMATION    [facility] ,    We are seeing Perla Jean, date of birth 1990, in the clinic at [facility]. Shreya Munoz NP is the patient's PCP. Perla Jean has an outstanding lab/procedure at the time we reviewed her chart. In order to help keep her health information updated, she has authorized us to request the following medical record(s):                                                 ( X )  EYE EXAM ( Most Recent )             Please fax records to [facility], Melerine, Linda T., NP, [phone] or [phone]     If you have any questions, please contact     Esther AQUINO  Clinical Care Coordinator    [facility]  [phone] (Phone)  [phone] (Fax)    Patient Name: Perla Jean  : 1990  Patient Phone #: [phone]                Perla Jean  MRN: [id number]  : 1990  Age: 33 y.o.  Sex: female         Patient/Legal Guardian Signature  This signature was collected at 07/15/2024    self       _______________________________   Printed Name/Relationship to Patient      Consent for Examination and Treatment: I hereby authorize the providers and employees of [facility] ([facility]) to provide medical treatment/services which includes, but is not limited to, performing and administering tests and diagnostic procedures that are deemed necessary, including, but not limited to, imaging examinations, blood tests and other laboratory procedures as may be required by the hospital, clinic, or may be ordered by my physician(s) or persons working under the general and/or special instructions of my physician(s).      I understand and agree that this consent covers all authorized persons, including but not limited to physicians, residents, nurse practitioners, physicians' assistants, specialists, consultants, student nurses, and independently contracted physicians, who are  called upon by the physician in charge, to carry out the diagnostic procedures and medical or surgical treatment.     I hereby authorize [facility] to retain or dispose of any specimens or tissue, should there be such remaining from any test or procedure.     I hereby authorize and give consent for [facility] providers and employees to take photographs, images or videotapes of such diagnostic, surgical or treatment procedures of Patient as may be required by [facility] or as may be ordered by a physician. I further acknowledge and agree that [facility] may use cameras or other devices for patient monitoring.     I am aware that the practice of medicine is not an exact science, and I acknowledge that no guarantees have been made to me as to the outcome of any tests, procedures or treatment.     Authorization for Release of Information: I understand that my insurance company and/or their agents may need information necessary to make determinations about payment/reimbursement. I hereby provide authorization to release to all insurance companies, their successors, assignees, other parties with whom they may have contracted, or others acting on their behalf, that are involved with payment for any hospital and/or clinic charges incurred by the patient, any information that they request and deem necessary for payment/reimbursement, and/or quality review.  I further authorize the release of my health information to physicians or other health care practitioners on staff who are involved in my health care now and in the future, and to other health care providers, entities, or institutions for the purpose of my continued care and treatment, including referrals.     REGISTRATION AUTHORIZATION  Form No. 42827 (Rev. 3/25/2024)    Page 1 of 3                       Medicare Patient's Certification and Authorization to Release Information and Payment Request:  I certify that the information given by me in applying for payment under Title XVIII  of the Social Security Act is correct. I authorize any damon of medical or other information about me to release to the Social SecurityAdministration, or its intermediaries or carriers, any information needed for this or a related Medicare claim. I request that payment of authorized benefits be made on my behalf.     Assignment of Insurance Benefits:   I hereby authorize any and all insurance companies, health plans, defined   benefit plans, health insurers or any entity that is or may be responsible for payment of my medical expenses to pay all hospital and medical benefits now due, and to become due and payable to me under any hospital benefits, sick benefits, injury benefits or any other benefit for services rendered to me, including Major Medical Benefits, direct to [facility] and all independently contracted physicians. I assign any and all rights that I may have against any and all insurance companies, health plans, defined benefit plans, health insurers or any entity that is or may be responsible for payment of my medical expenses, including, but not limited to any right to appeal a denial of a claim, any right to bring any action, lawsuit, administrative proceeding, or other cause of action on my behalf. I specifically assign my right to pursue litigation against any and all insurance companies, health plans, defined benefit plans, health insurers or any entity that is or may be responsible for payment of my medical expenses based upon a refusal to pay charges.            E. Valuables: It is understood and agreed that [facility] is not liable for the damage to or loss of any money, jewelry,   documents, dentures, eye glasses, hearing aids, prosthetics, or other property of value.     F. Computer Equipment: I understand and agree that should I choose to use computer equipment owned by [facility] or if I choose to access the Internet via [facility]s network, I do so at my own risk. [facility] is not responsible for any  damage to my computer equipment or to any damages of any type that might arise from my loss of equipment or data.     G. Acceptance of Financial Responsibility:  I agree that in consideration of the services and   supplies that have been   or will be furnished to the patient, I am hereby obligated to pay all charges made for or on the account of the patient according to the standard rates (in effect at the time the services and supplies are delivered) established by [facility], including its Patient Financial Assistance Policy to the extent it is applicable. I understand that I am responsible for all charges, or portions thereof, not covered by insurance or other sources. Patient refunds will be distributed only after balances at all [facility] facilities are paid.     H. Communication Authorization:  I hereby authorize [facility] and its representatives, along with any billing service   or  who may work on their behalf, to contact me on   my cell phone and/or home phone using pre- recorded messages, artificial voice messages, automatic telephone dialing devices or other computer assisted technology, or by electronic      mail, text messaging, or by any other form of electronic communication. This includes, but is not limited to, appointment reminders, yearly physical exam reminders, preventive care reminders, patient campaigns, welcome calls, and calls about account balances on my account or any account on which I am listed as a guarantor. I understand I have the right to opt out of these communications at any time.      Relationship  Between  Facility and  Provider:      I understand that some, but not all, providers furnishing services to the patient are not employees or agents of [facility]. The patient is under the care and supervision of his/her attending physician, and it is the responsibility of the facility and its nursing staff to carry out the instructions of such physicians. It is the responsibility  of the patient's physician/designee to obtain the patient's informed consent, when required, for medical or surgical treatment, special diagnostic or therapeutic procedures, or hospital services rendered for the patient under the special instructions of the physician/designee.           REGISTRATION AUTHORIZATION  Form No. 83907 (Rev. 3/25/2024)    Page 2 of 3                       Immunizations: [facility] shares immunization information with state sponsored health departments to help you and your doctor keep track of your immunization records. By signing, you consent to have this information shared with the health department in your state:                                [place] - LINKS ([place] Immunization Network for Kids Statewide)                                [place] - MIIX ([place] Immunization Information eXchange)                                [place] - ImmPRINT (Immunization Patient Registry with Integrated Technology)     TERM: This authorization is valid for this and subsequent care/treatment I receive at [facility] and will remain valid unless/until revoked in writing by me.     [facility]: As used in this document, [facility] means all [facility] owned and managed facilities, including, but not limited to, all health centers, surgery centers, clinics, urgent care centers, and hospitals.         [facility] complies with applicable Federal civil rights laws and does not discriminate on the basis of race, color, national origin, age, disability, or sex.  ATENCIÓN: si kamaljit mancera, tiene a marshall disposición servicios gratuitos de asistencia lingüística. Llame al [phone].  CHÚ Ý: N?u b?n nói Ti?ng Vi?t, có các d?ch v? h? tr? ngôn ng? mi?n phí dành cho b?n. G?i s? [phone].        REGISTRATION AUTHORIZATION  Form No. 88997 (Rev. 3/25/2024)   Page 3 of 3       DIABETIC EYE EXAM  Patient Name: _____________________  : _________________________  Visual Acuity:              Right Eye _____________________ Left Eye: ______________________    Significant Findings:  ________ No Diabetic Retinopathy  ________ Minimal Background Diabetic Retinopathy  ________ Moderate to Severe Background Diabetic Retinopathy  ________ Clinically Significant Macular Edema  ________ Proliferative Diabetic Retinopathy    Further Testing / Treatment:  ________ No Treatment  ________ Intravenous Fluorescein Angiogram  ________ Laser: Focal / Pan Retinal (PRP)  ________ Other: ____________________________________      Signature of Examining Physician: _______________________  Name of Facility: ____________________________________  DATE OF DIABETIC EYE EXAM: __________________________      PLEASE FAX COMPLETED FORM TO : [phone] or [phone]

## 2024-07-16 NOTE — PROGRESS NOTES
Population Health Chart Review & Patient Outreach Details      Additional [facility] Health Notes:               Updates Requested / Reviewed:      Updated Care Coordination Note, Care Everywhere, , External Sources: [facility] and [facility], and Immunizations Reconciliation Completed or Queried: [facility] Topics Overdue:      [facility] Score: 3     Urine Screening  Eye Exam  Lipid Panel                       Health Maintenance Topic(s) Outreach Outcomes & Actions Taken:    Cervical Cancer Screening - Outreach Outcomes & Actions Taken  : External Records Uploaded & Care Team Updated if Applicable    Eye Exam - Outreach Outcomes & Actions Taken  : External Records Requested & Care Team Updated if Applicable

## 2024-07-22 ENCOUNTER — PATIENT OUTREACH (OUTPATIENT)
Dept: ADMINISTRATIVE | Facility: HOSPITAL | Age: 34
End: 2024-07-22
Payer: MEDICAID

## 2024-07-22 NOTE — PROGRESS NOTES
Population Health Chart Review & Patient Outreach Details      Additional [facility] Notes:               Updates Requested / Reviewed:      Updated Care Coordination Note         Health Maintenance Topics Overdue:      [facility] Score: 3     Urine Screening  Eye Exam  Lipid Panel                       Health Maintenance Topic(s) Outreach Outcomes & Actions Taken:    Eye Exam - Outreach Outcomes & Actions Taken  : Diabetic Eye External Records Uploaded, Care Team & History Updated if Applicable

## 2024-08-22 DIAGNOSIS — R56.9 SEIZURES: Primary | ICD-10-CM

## 2024-09-09 ENCOUNTER — HOSPITAL ENCOUNTER (OUTPATIENT)
Dept: RADIOLOGY | Facility: HOSPITAL | Age: 34
Discharge: HOME | End: 2024-09-09
Attending: STUDENT IN AN ORGANIZED HEALTH CARE EDUCATION/TRAINING PROGRAM
Payer: MEDICAID

## 2024-09-09 DIAGNOSIS — R56.9 SEIZURES: ICD-10-CM

## 2024-09-27 ENCOUNTER — TELEPHONE (OUTPATIENT)
Dept: FAMILY MEDICINE | Facility: CLINIC | Age: 34
End: 2024-09-27
Payer: MEDICAID

## 2024-10-15 ENCOUNTER — TELEPHONE (OUTPATIENT)
Dept: FAMILY MEDICINE | Facility: CLINIC | Age: 34
End: 2024-10-15

## 2024-10-15 NOTE — TELEPHONE ENCOUNTER
----- Message from [place] sent at 10/15/2024 12:01 PM CDT -----  Returning a phone.   [phone]

## 2024-10-23 ENCOUNTER — OFFICE VISIT (OUTPATIENT)
Dept: FAMILY MEDICINE | Facility: CLINIC | Age: 34
End: 2024-10-23
Payer: MEDICAID

## 2024-10-23 VITALS
HEIGHT: 64 IN | SYSTOLIC BLOOD PRESSURE: 132 MMHG | OXYGEN SATURATION: 99 % | HEART RATE: 98 BPM | WEIGHT: 235 LBS | BODY MASS INDEX: 40.12 KG/M2 | DIASTOLIC BLOOD PRESSURE: 82 MMHG

## 2024-10-23 DIAGNOSIS — E66.01 MORBID OBESITY DUE TO EXCESS CALORIES: ICD-10-CM

## 2024-10-23 DIAGNOSIS — E11.9 TYPE 2 DIABETES MELLITUS WITHOUT COMPLICATION, WITHOUT LONG-TERM CURRENT USE OF INSULIN: Primary | ICD-10-CM

## 2024-10-23 DIAGNOSIS — G40.909 NONINTRACTABLE EPILEPSY WITHOUT STATUS EPILEPTICUS, UNSPECIFIED EPILEPSY TYPE: ICD-10-CM

## 2024-10-23 DIAGNOSIS — Z23 NEED FOR INFLUENZA VACCINATION: ICD-10-CM

## 2024-10-23 LAB — HBA1C MFR BLD: 6.1 %

## 2024-10-23 PROCEDURE — 90471 IMMUNIZATION ADMIN: CPT | Mod: S$GLB,,, | Performed by: NURSE PRACTITIONER

## 2024-10-23 PROCEDURE — 83036 HEMOGLOBIN GLYCOSYLATED A1C: CPT | Mod: QW,,, | Performed by: NURSE PRACTITIONER

## 2024-10-23 PROCEDURE — 99214 OFFICE O/P EST MOD 30 MIN: CPT | Mod: 25,S$GLB,, | Performed by: NURSE PRACTITIONER

## 2024-10-23 PROCEDURE — 3079F DIAST BP 80-89 MM HG: CPT | Mod: CPTII,S$GLB,, | Performed by: NURSE PRACTITIONER

## 2024-10-23 PROCEDURE — 1159F MED LIST DOCD IN RCRD: CPT | Mod: CPTII,S$GLB,, | Performed by: NURSE PRACTITIONER

## 2024-10-23 PROCEDURE — 1160F RVW MEDS BY RX/DR IN RCRD: CPT | Mod: CPTII,S$GLB,, | Performed by: NURSE PRACTITIONER

## 2024-10-23 PROCEDURE — 3008F BODY MASS INDEX DOCD: CPT | Mod: CPTII,S$GLB,, | Performed by: NURSE PRACTITIONER

## 2024-10-23 PROCEDURE — 3075F SYST BP GE 130 - 139MM HG: CPT | Mod: CPTII,S$GLB,, | Performed by: NURSE PRACTITIONER

## 2024-10-23 PROCEDURE — 3044F HG A1C LEVEL LT 7.0%: CPT | Mod: CPTII,S$GLB,, | Performed by: NURSE PRACTITIONER

## 2024-10-23 PROCEDURE — 90656 IIV3 VACC NO PRSV 0.5 ML IM: CPT | Mod: S$GLB,,, | Performed by: NURSE PRACTITIONER

## 2024-10-23 RX ORDER — LEVETIRACETAM 500 MG/1
500 TABLET ORAL 2 TIMES DAILY
COMMUNITY

## 2024-10-23 RX ORDER — METFORMIN HYDROCHLORIDE 500 MG/1
500 TABLET, EXTENDED RELEASE ORAL
Qty: 90 TABLET | Refills: 3 | Status: SHIPPED | OUTPATIENT
Start: 2024-10-23

## 2024-10-23 RX ORDER — SEMAGLUTIDE 0.68 MG/ML
0.5 INJECTION, SOLUTION SUBCUTANEOUS
Qty: 3 EACH | Refills: 3 | Status: SHIPPED | OUTPATIENT
Start: 2024-10-23

## 2024-10-23 NOTE — PROGRESS NOTES
"  SUBJECTIVE:    Patient ID: Perla Jean is a 34 y.o. female.    Chief Complaint: Diabetes (No bottles//Pt is here for a check up//Pt would like her flu vaccine//Pt will scheduled eye exam later, with [facility]//VICENTE )      History of Present Illness    CHIEF COMPLAINT:  Perla presents today for follow-up on diabetes management.  At last visit A1c was up to 6.9% and patient was started on Ozempic    DIABETES MANAGEMENT:  She reports tolerating Ozempic well with no major side effects such as nausea, vomiting, or constipation. She notes reduced appetite since starting the medication and continues Metformin as prescribed. She has experienced significant weight loss, reporting a 12-pound reduction. She acknowledges making dietary changes to support her diabetes management.    NEUROLOGICAL STATUS:  Since last visit she did see neurology for hx of epilepsy as a child though she can't remember provider's name. She had reported at last visit she "gets the shakes" but was unclear if this was actually seizure activity. She reports she had an EEG though she's not sure of results but she was prescribed Keppra 500 mg BID- reports she believes she has f/u in a couple months    GYNECOLOGICAL HEALTH:  She reports regular menstrual cycles without significant issues. She is due for a Pap smear on the 28th of the current month with Dr. Jj, her gynecologist.    UPCOMING APPOINTMENTS:  She has an upcoming ophthalmology appointment scheduled with  [facility] for her annual diabetic eye exam.      ROS:  General: no fever, no chills, no fatigue, no weight gain, complains of weight loss, complains of loss of appetite d/t mounjaro  Eyes: no vision changes, no redness, no discharge  ENT: no ear pain, no nasal congestion, no sore throat  Cardiovascular: no chest pain, no palpitations, no lower extremity edema  Respiratory: no cough, no shortness of breath  Gastrointestinal: no abdominal pain, no nausea, no vomiting, no " diarrhea, no constipation, no blood in stool  Genitourinary: no dysuria, no hematuria, no frequency  Musculoskeletal: no joint pain, no muscle pain  Skin: no rash, no lesion  Neurological: no headache, no dizziness, no numbness, no tingling, no seizure activity  Psychiatric: no anxiety, no depression, no sleep difficulty          Answers submitted by the patient for this visit:  Review of Systems Questionnaire (Submitted on 10/15/2024)  activity change: No  unexpected weight change: No  neck pain: No  hearing loss: No  rhinorrhea: No  trouble swallowing: No  eye discharge: No  visual disturbance: No  chest tightness: No  wheezing: No  chest pain: No  palpitations: No  blood in stool: No  constipation: No  vomiting: No  diarrhea: No  polydipsia: No  polyuria: No  difficulty urinating: No  hematuria: No  menstrual problem: No  dysuria: No  joint swelling: No  arthralgias: No  headaches: No  weakness: No  confusion: No  dysphoric mood: No      Office Visit on 10/23/2024   Component Date Value Ref Range Status    Hemoglobin A1C, POC 10/23/2024 6.1  % Final   Patient Outreach on 07/22/2024   Component Date Value Ref Range Status    Left Eye DM Retinopathy 12/05/2022 Negative   Final    Right Eye DM Retinopathy 12/05/2022 Negative   Final   Patient Outreach on 07/16/2024   Component Date Value Ref Range Status    PAP Recommendation External 08/19/2020 Pap in 5 years   Final    Pap 08/19/2020 Negative for intraephithelial lesion or malignancy  Negative for intraephithelial lesion or malignancy, Other Final    HPV DNA 08/19/2020 None Detected  None Detected Final   Office Visit on 07/15/2024   Component Date Value Ref Range Status    Hemoglobin A1C, POC 07/15/2024 6.9  % Final       Past Medical History:   Diagnosis Date    Diabetes mellitus, type 2     Epilepsy     in childhood     Past Surgical History:   Procedure Laterality Date    CHOLECYSTECTOMY       Family History   Problem Relation Name Age of Onset    Asthma  Mother Noelle Garcia     Arthritis Mother Noelle Garcia     No Known Problems Father         Tests to Keep You Healthy    Eye Exam: DUE  Cervical Cancer Screening: Met on 2/14/2023  Last HbA1c < 8 (10/23/2024): Yes      The CVD Risk score (Barbara, et al., 2008) failed to calculate for the following reasons:    Cannot find a previous HDL lab    Cannot find a previous total cholesterol lab     Marital Status: Single  Alcohol History:  reports no history of alcohol use.  Tobacco History:  reports that she has never smoked. She has never used smokeless tobacco.  Drug History:  reports no history of drug use.    Health Maintenance Topics with due status: Not Due       Topic Last Completion Date    Cervical Cancer Screening 02/14/2023    Foot Exam 07/15/2024    Hemoglobin A1c 10/23/2024    RSV Vaccine (Age 60+ and Pregnant patients) Not Due     Immunization History   Administered Date(s) Administered    COVID-19 MRNA, LN-S PF (MODERNA HALF 0.25 ML DOSE) 01/09/2022    COVID-19, MRNA, LN-S, PF (MODERNA FULL 0.5 ML DOSE) 05/04/2021, 06/01/2021    HPV Quadrivalent 01/24/2008    Hepatitis B 05/10/1999, 05/29/2001    Hepatitis B, Pediatric/Adolescent 11/02/2007, 01/24/2008    Influenza 12/12/2022    Influenza - Quadrivalent 12/18/2015    Influenza - Trivalent - Fluarix, Flulaval, Fluzone, Afluria - PF 10/23/2024    MMR 11/02/2007    Meningococcal Conjugate (MCV4P) 11/02/2007    PPD Test 05/29/2001    Tdap 11/02/2007    Varicella 01/24/2008       Review of patient's allergies indicates:  No Known Allergies    Current Outpatient Medications:     BLOOD PRESSURE CUFF Misc, 1 Units by Misc.(Non-Drug; Combo Route) route 2 (two) times a day., Disp: 1 each, Rfl: 0    ibuprofen (ADVIL,MOTRIN) 600 MG tablet, Take 1 tablet (600 mg total) by mouth every 6 (six) hours as needed (cramping)., Disp: 60 tablet, Rfl: 0    levETIRAcetam (KEPPRA) 500 MG Tab, Take 500 mg by mouth 2 (two) times daily., Disp: , Rfl:     metFORMIN (GLUCOPHAGE-XR)  "500 MG ER 24hr tablet, Take 1 tablet (500 mg total) by mouth daily with breakfast., Disp: 90 tablet, Rfl: 3    semaglutide (OZEMPIC) 0.25 mg or 0.5 mg (2 mg/3 mL) pen injector, Inject 0.5 mg into the skin every 7 days., Disp: 3 each, Rfl: 3  No current facility-administered medications for this visit.        Objective:      Vitals:    10/23/24 1127 10/23/24 1201   BP: 132/82    Pulse: (!) 111 98   SpO2: 99%    Weight: 106.6 kg (235 lb)    Height: 5' 4" (1.626 m)        Physical Exam  Vitals and nursing note reviewed.   Constitutional:       General: She is not in acute distress.     Appearance: She is well-developed. She is obese.   HENT:      Head: Normocephalic and atraumatic.   Neck:      Vascular: No carotid bruit.   Cardiovascular:      Rate and Rhythm: Normal rate and regular rhythm.      Heart sounds: No murmur heard.  Pulmonary:      Effort: Pulmonary effort is normal. No respiratory distress.      Breath sounds: Normal breath sounds. No wheezing or rales.   Abdominal:      General: There is no distension.      Palpations: Abdomen is soft.      Tenderness: There is no abdominal tenderness.   Musculoskeletal:      Cervical back: Neck supple.      Right lower leg: No edema.      Left lower leg: No edema.   Lymphadenopathy:      Cervical: No cervical adenopathy.   Skin:     General: Skin is warm and dry.      Findings: No rash.   Neurological:      General: No focal deficit present.      Mental Status: She is alert and oriented to person, place, and time.      Gait: Gait normal.   Psychiatric:         Mood and Affect: Mood normal.          Assessment:       1. Type 2 diabetes mellitus without complication, without long-term current use of insulin    2. Morbid obesity due to excess calories    3. Need for influenza vaccination    4. Nonintractable epilepsy without status epilepticus, unspecified epilepsy type           Assessment & Plan    - Assessed patient's response to Ozempic, noting improved diabetes " control with A1c reduction from 6.9 to 6.1  - Reviewed neurologist's evaluation for seizure history, including EEG results and new Keppra prescription  - Evaluated weight loss progress, with patient losing 12 lbs  - Will maintain current Ozempic dose (0.5 mg) due to good response      DIABETES:  - Discussed importance of eating healthy food while on Ozempic.  - Explained importance of protein intake (meat, beans, eggs).  - Reviewed the need to reduce sugar intake, especially from sugary drinks.  - Discussed benefits of water consumption over sugary beverages.  - Recommend maintaining healthy eating habits while on Ozempic.  - Perla to increase water intake.  - Continued Ozempic 0.5 mg weekly injection.  - Continued Metformin.  - Refilled Ozempic for 6 months.  - Follow up for scheduled eye exam (as recommended for diabetes management).    MEDICATIONS/SUPPLEMENTS:  - Blood work ordered to be done today.    Nonintractable epilepsy:  - Follow up with neurologist for monitoring of Keppra treatment.    PAP SMEAR:  - Follow up on the 28th with Dr. Jj (gynecologist) as scheduled for Pap smear.    FOLLOW UP:  - Contact the office if any issues arise.        Plan:       1. Type 2 diabetes mellitus without complication, without long-term current use of insulin  -     Hemoglobin A1C, POCT  -     semaglutide (OZEMPIC) 0.25 mg or 0.5 mg (2 mg/3 mL) pen injector; Inject 0.5 mg into the skin every 7 days.  Dispense: 3 each; Refill: 3  -     metFORMIN (GLUCOPHAGE-XR) 500 MG ER 24hr tablet; Take 1 tablet (500 mg total) by mouth daily with breakfast.  Dispense: 90 tablet; Refill: 3    2. Morbid obesity due to excess calories  -     semaglutide (OZEMPIC) 0.25 mg or 0.5 mg (2 mg/3 mL) pen injector; Inject 0.5 mg into the skin every 7 days.  Dispense: 3 each; Refill: 3    3. Need for influenza vaccination  -     influenza (Flulaval, Fluzone, Fluarix) 45 mcg/0.5 mL IM vaccine (> or = 6 mo) 0.5 mL    4. Nonintractable epilepsy  without status epilepticus, unspecified epilepsy type      Follow up in about 4 months (around 2/23/2025) for labs to be drawn today, Diabetes.          Counseled on age and gender appropriate medical preventative services, including cancer screenings, immunizations, overall nutritional health, need for a consistent exercise regimen and an overall push towards maintaining a vigorous and active lifestyle.      This note was generated with the assistance of ambient listening technology. Verbal consent was obtained by the patient and accompanying visitor(s) for the recording of patient appointment to facilitate this note. I attest to having reviewed and edited the generated note for accuracy, though some syntax or spelling errors may persist. Please contact the author of this note for any clarification.       10/23/2024 Shreya Munoz NP

## 2024-10-24 ENCOUNTER — TELEPHONE (OUTPATIENT)
Dept: FAMILY MEDICINE | Facility: CLINIC | Age: 34
End: 2024-10-24
Payer: MEDICAID

## 2024-10-24 NOTE — TELEPHONE ENCOUNTER
----- Message from Shreya Munoz NP sent at 10/23/2024  5:10 PM CDT -----  Please request neuro office notes from Dr[facility] Ridley, [facility] in [place]

## 2024-10-24 NOTE — TELEPHONE ENCOUNTER
[facility] [place]     Fax number [phone]    Reminder set up

## 2025-02-17 ENCOUNTER — PATIENT MESSAGE (OUTPATIENT)
Dept: ADMINISTRATIVE | Facility: HOSPITAL | Age: 35
End: 2025-02-17
Payer: MEDICAID

## 2025-02-25 ENCOUNTER — OFFICE VISIT (OUTPATIENT)
Dept: FAMILY MEDICINE | Facility: CLINIC | Age: 35
End: 2025-02-25
Payer: MEDICAID

## 2025-02-25 VITALS
BODY MASS INDEX: 38.07 KG/M2 | HEIGHT: 64 IN | WEIGHT: 223 LBS | HEART RATE: 106 BPM | SYSTOLIC BLOOD PRESSURE: 122 MMHG | DIASTOLIC BLOOD PRESSURE: 80 MMHG | OXYGEN SATURATION: 99 %

## 2025-02-25 DIAGNOSIS — E11.9 TYPE 2 DIABETES MELLITUS WITHOUT COMPLICATION, WITHOUT LONG-TERM CURRENT USE OF INSULIN: Primary | ICD-10-CM

## 2025-02-25 DIAGNOSIS — R00.0 TACHYCARDIA: ICD-10-CM

## 2025-02-25 DIAGNOSIS — G40.909 NONINTRACTABLE EPILEPSY WITHOUT STATUS EPILEPTICUS, UNSPECIFIED EPILEPSY TYPE: ICD-10-CM

## 2025-02-25 DIAGNOSIS — D72.829 LEUKOCYTOSIS, UNSPECIFIED TYPE: ICD-10-CM

## 2025-02-25 DIAGNOSIS — E66.01 MORBID OBESITY DUE TO EXCESS CALORIES: ICD-10-CM

## 2025-02-25 LAB — HBA1C MFR BLD: 6.1 %

## 2025-02-25 RX ORDER — SEMAGLUTIDE 1.34 MG/ML
1 INJECTION, SOLUTION SUBCUTANEOUS
Qty: 3 ML | Refills: 11 | Status: SHIPPED | OUTPATIENT
Start: 2025-02-25 | End: 2026-02-25

## 2025-02-25 NOTE — PROGRESS NOTES
SUBJECTIVE:    Patient ID: Perla Jean is a 34 y.o. female.    Chief Complaint: Diabetes (No bottles//Pt is here for a check up and medication refills. Pt would like to discuss increasing her Ozempic dose//Pt will scheduled eye exam later//VICENTE )      History of Present Illness    CHIEF COMPLAINT:  Perla presents today for diabetes follow up.    Pt reports overall she's been doing well since last visit    DIABETES MANAGEMENT:  She reports 24-pound weight loss since starting ozempic in July. She denies any side effects from Ozempic and would like to increase dose.  No issues with nausea, vomiting, constipation, or diarrhea.  She denies engaging in any regular exercise.    NEUROLOGICAL HISTORY:  She denies any recent seizure activity or recurrence of previously experienced spells. She continues Keppra for seizure control. States needs to find a new neurologist due to insurance changes.      ROS:  General: no fever, no chills, no fatigue, no weight gain, complains of weight loss  Eyes: no vision changes, no redness, no discharge  ENT: no ear pain, no nasal congestion, no sore throat  Cardiovascular: no chest pain, no palpitations, no lower extremity edema  Respiratory: no cough, no shortness of breath  Gastrointestinal: no abdominal pain, no nausea, no vomiting, no diarrhea, no constipation, no blood in stool  Genitourinary: no dysuria, no hematuria, no frequency  Musculoskeletal: no joint pain, no muscle pain  Skin: no rash, no lesion  Neurological: no headache, no dizziness, no numbness, no tingling, no seizures  Psychiatric: no anxiety, no depression, no sleep difficulty              Office Visit on 02/25/2025   Component Date Value Ref Range Status    Hemoglobin A1C, POC 02/25/2025 6.1  % Final   Office Visit on 10/23/2024   Component Date Value Ref Range Status    Hemoglobin A1C, POC 10/23/2024 6.1  % Final       Past Medical History:   Diagnosis Date    Diabetes mellitus, type 2     Epilepsy     in  childhood     Past Surgical History:   Procedure Laterality Date    CHOLECYSTECTOMY       Family History   Problem Relation Name Age of Onset    Asthma Mother Noelle Garcia     Arthritis Mother Noelle Garcia     No Known Problems Father         Tests to Keep You Healthy    Eye Exam: DUE  Cervical Cancer Screening: Met on 2/14/2023  Last HbA1c < 8 (10/23/2024): Yes      The 10-year CVD risk score (Barbara et al., 2008) is: 3.2%    Values used to calculate the score:      Age: 34 years      Sex: Female      Diabetic: Yes      Tobacco smoker: No      Systolic Blood Pressure: 122 mmHg      Is BP treated: No      HDL Cholesterol: 34 mg/dL      Total Cholesterol: 153 mg/dL     Marital Status: Single  Alcohol History:  reports no history of alcohol use.  Tobacco History:  reports that she has never smoked. She has never used smokeless tobacco.  Drug History:  reports no history of drug use.    Health Maintenance Topics with due status: Not Due       Topic Last Completion Date    Cervical Cancer Screening 02/14/2023    Foot Exam 07/15/2024    Lipid Panel 10/23/2024    Hemoglobin A1c 02/25/2025    RSV Vaccine (Age 60+ and Pregnant patients) Not Due     Immunization History   Administered Date(s) Administered    COVID-19 MRNA, LN-S PF (MODERNA HALF 0.25 ML DOSE) 01/09/2022    COVID-19, MRNA, LN-S, PF (MODERNA FULL 0.5 ML DOSE) 05/04/2021, 06/01/2021    HPV Quadrivalent 01/24/2008    Hepatitis B 05/10/1999, 05/29/2001    Hepatitis B, Pediatric/Adolescent 11/02/2007, 01/24/2008    Influenza 12/12/2022    Influenza - Quadrivalent 12/18/2015    Influenza - Trivalent - Fluarix, Flulaval, Fluzone, Afluria - PF 10/23/2024    MMR 11/02/2007    Meningococcal Conjugate (MCV4P) 11/02/2007    PPD Test 05/29/2001    Tdap 11/02/2007    Varicella 01/24/2008       Review of patient's allergies indicates:  No Known Allergies  Current Medications[1]        Objective:      Vitals:    02/25/25 1555 02/25/25 1624   BP: 122/80    Pulse: (!) 117  "106   SpO2: 99%    Weight: 101.2 kg (223 lb)    Height: 5' 4" (1.626 m)        Physical Exam  Vitals and nursing note reviewed.   Constitutional:       General: She is not in acute distress.     Appearance: She is well-developed. She is obese.   HENT:      Head: Normocephalic and atraumatic.      Right Ear: Tympanic membrane and ear canal normal.      Left Ear: Tympanic membrane and ear canal normal.   Neck:      Vascular: No carotid bruit.   Cardiovascular:      Rate and Rhythm: Regular rhythm. Tachycardia present.      Heart sounds: No murmur heard.  Pulmonary:      Effort: Pulmonary effort is normal. No respiratory distress.      Breath sounds: Normal breath sounds. No wheezing or rales.   Abdominal:      General: There is no distension.      Palpations: Abdomen is soft.      Tenderness: There is no abdominal tenderness.   Musculoskeletal:      Cervical back: Neck supple.      Right lower leg: No edema.      Left lower leg: No edema.   Lymphadenopathy:      Cervical: No cervical adenopathy.   Skin:     General: Skin is warm and dry.      Findings: No rash.   Neurological:      General: No focal deficit present.      Mental Status: She is alert and oriented to person, place, and time.      Gait: Gait normal.   Psychiatric:         Mood and Affect: Mood normal.          Assessment:       1. Type 2 diabetes mellitus without complication, without long-term current use of insulin    2. Nonintractable epilepsy without status epilepticus, unspecified epilepsy type    3. Leukocytosis, unspecified type           Assessment & Plan    - A1c stable at 6.1, consistent with previous measurement  - Weight loss of 24 lbs since July noted, likely attributed to Ozempic  - Increased Ozempic dose from 0.5 mg to 1 mg weekly to further improve A1c and weight loss  - Reviewed recent lab work from October: LDL cholesterol slightly elevated at 99, thyroid normal, kidney and liver function acceptable  - Noted persistently elevated white " blood cell count, requires monitoring    DIABETES:  - Increased Ozempic dose from 0.5 mg to 1 mg weekly.  - Continued metformin.  - Educated the patient on the importance of diabetic eye exams.  - Advised the patient to schedule a diabetic eye exam, suggesting [facility] as a potential location if covered by Medicaid.  - Scheduled follow up in 4 months (June) for diabetes management.  - Instructed the patient to contact the office if experiencing severe nausea, vomiting, or other concerning symptoms from increased Ozempic dose.  - Emphasized the importance of consuming healthy protein and vegetables while monitoring carbohydrate intake.  - Discussed importance of healthy protein intake while on Ozempic to prevent muscle loss.  - Perla to consume healthy proteins (meat, beans) and vegetables.  - Perla to limit carbohydrate intake, especially snacks like crackers, cookies, and chips.    WEIGHT MANAGEMENT:  - Observed weight decrease of 24 lbs since July.  - Stressed the significance of regular exercise to maintain muscle mass during weight loss.  - Educated on the necessity of regular exercise to maintain muscle mass during weight loss.  - Recommend incorporating regular exercise into routine to maintain muscle mass.      SEIZURE DISORDER:  - Continued Keppra for seizure management.  - Confirmed patient denies any recent seizure activity or episodes.  - Inquired about follow-up with neurologist-will need to see who is on her insurance plan      ELEVATED HEART RATE:  - Monitored patient's heart rate, which was initially elevated but decreased to 106 upon rechecking.  - Noted history of elevated heart rates in previous visits.  - Inquired about patient's hydration status as a potential factor for elevated heart rate. Cautioned to avoid caffeine and stimulants  -will monitor           Plan:       1. Type 2 diabetes mellitus without complication, without long-term current use of insulin  -     Hemoglobin A1C, POCT  -      semaglutide (OZEMPIC) 1 mg/dose (4 mg/3 mL); Inject 1 mg into the skin every 7 days.  Dispense: 3 mL; Refill: 11  -     Comprehensive Metabolic Panel; Future; Expected date: 02/25/2025  -     CBC Auto Differential; Future; Expected date: 02/25/2025  -     Lipid Panel; Future; Expected date: 02/25/2025  -     TSH w/reflex to FT4; Future; Expected date: 02/25/2025  -     Urinalysis, Reflex to Urine Culture Urine, Clean Catch; Future; Expected date: 02/25/2025  -     Microalbumin/Creatinine Ratio, Urine; Future; Expected date: 02/25/2025  -     Hemoglobin A1C; Future; Expected date: 02/25/2025    2. Nonintractable epilepsy without status epilepticus, unspecified epilepsy type  -     Comprehensive Metabolic Panel; Future; Expected date: 02/25/2025  -     CBC Auto Differential; Future; Expected date: 02/25/2025  -     Lipid Panel; Future; Expected date: 02/25/2025  -     TSH w/reflex to FT4; Future; Expected date: 02/25/2025  -     Urinalysis, Reflex to Urine Culture Urine, Clean Catch; Future; Expected date: 02/25/2025  -     Microalbumin/Creatinine Ratio, Urine; Future; Expected date: 02/25/2025  -     Hemoglobin A1C; Future; Expected date: 02/25/2025    3. Leukocytosis, unspecified type  -     Comprehensive Metabolic Panel; Future; Expected date: 02/25/2025  -     CBC Auto Differential; Future; Expected date: 02/25/2025  -     Lipid Panel; Future; Expected date: 02/25/2025  -     TSH w/reflex to FT4; Future; Expected date: 02/25/2025  -     Urinalysis, Reflex to Urine Culture Urine, Clean Catch; Future; Expected date: 02/25/2025  -     Microalbumin/Creatinine Ratio, Urine; Future; Expected date: 02/25/2025  -     Hemoglobin A1C; Future; Expected date: 02/25/2025      Follow up in about 4 months (around 6/25/2025) for Diabetes.          Counseled on age and gender appropriate medical preventative services, including cancer screenings, immunizations, overall nutritional health, need for a consistent exercise regimen and  an overall push towards maintaining a vigorous and active lifestyle.      This note was generated with the assistance of ambient listening technology. Verbal consent was obtained by the patient and accompanying visitor(s) for the recording of patient appointment to facilitate this note. I attest to having reviewed and edited the generated note for accuracy, though some syntax or spelling errors may persist. Please contact the author of this note for any clarification.       2/25/2025 Shreya Munoz NP           [1]   Current Outpatient Medications:     BLOOD PRESSURE CUFF Misc, 1 Units by Misc.(Non-Drug; Combo Route) route 2 (two) times a day., Disp: 1 each, Rfl: 0    ibuprofen (ADVIL,MOTRIN) 600 MG tablet, Take 1 tablet (600 mg total) by mouth every 6 (six) hours as needed (cramping)., Disp: 60 tablet, Rfl: 0    levETIRAcetam (KEPPRA) 500 MG Tab, Take 500 mg by mouth 2 (two) times daily., Disp: , Rfl:     metFORMIN (GLUCOPHAGE-XR) 500 MG ER 24hr tablet, Take 1 tablet (500 mg total) by mouth daily with breakfast., Disp: 90 tablet, Rfl: 3    semaglutide (OZEMPIC) 1 mg/dose (4 mg/3 mL), Inject 1 mg into the skin every 7 days., Disp: 3 mL, Rfl: 11

## 2025-03-24 LAB
LEFT EYE DM RETINOPATHY: NEGATIVE
RIGHT EYE DM RETINOPATHY: NEGATIVE

## 2025-04-10 ENCOUNTER — PATIENT MESSAGE (OUTPATIENT)
Dept: FAMILY MEDICINE | Facility: CLINIC | Age: 35
End: 2025-04-10
Payer: MEDICAID

## 2025-04-10 DIAGNOSIS — E11.9 TYPE 2 DIABETES MELLITUS WITHOUT COMPLICATION, WITHOUT LONG-TERM CURRENT USE OF INSULIN: Primary | ICD-10-CM

## 2025-04-10 RX ORDER — TIRZEPATIDE 5 MG/.5ML
5 INJECTION, SOLUTION SUBCUTANEOUS
Qty: 2 ML | Refills: 0 | Status: SHIPPED | OUTPATIENT
Start: 2025-05-11 | End: 2025-06-10

## 2025-04-10 NOTE — TELEPHONE ENCOUNTER
Please let patient know I will send in Mounjaro.  For the 1st month will do 5 mg Mounjaro weekly and then after 4 weeks if she is tolerating she can go up to 7.5 mg weekly

## 2025-05-10 ENCOUNTER — PATIENT MESSAGE (OUTPATIENT)
Dept: FAMILY MEDICINE | Facility: CLINIC | Age: 35
End: 2025-05-10
Payer: MEDICAID

## 2025-05-23 ENCOUNTER — PATIENT MESSAGE (OUTPATIENT)
Dept: ADMINISTRATIVE | Facility: HOSPITAL | Age: 35
End: 2025-05-23
Payer: MEDICAID

## 2025-06-10 ENCOUNTER — TELEPHONE (OUTPATIENT)
Dept: FAMILY MEDICINE | Facility: CLINIC | Age: 35
End: 2025-06-10
Payer: MEDICAID

## 2025-07-07 ENCOUNTER — OFFICE VISIT (OUTPATIENT)
Dept: FAMILY MEDICINE | Facility: CLINIC | Age: 35
End: 2025-07-07
Payer: MEDICAID

## 2025-07-07 VITALS
HEART RATE: 102 BPM | WEIGHT: 241 LBS | BODY MASS INDEX: 41.15 KG/M2 | HEIGHT: 64 IN | DIASTOLIC BLOOD PRESSURE: 80 MMHG | SYSTOLIC BLOOD PRESSURE: 122 MMHG | OXYGEN SATURATION: 99 %

## 2025-07-07 DIAGNOSIS — G40.909 NONINTRACTABLE EPILEPSY WITHOUT STATUS EPILEPTICUS, UNSPECIFIED EPILEPSY TYPE: ICD-10-CM

## 2025-07-07 DIAGNOSIS — E78.5 DM TYPE 2 WITH DIABETIC DYSLIPIDEMIA: Primary | ICD-10-CM

## 2025-07-07 DIAGNOSIS — E66.01 MORBID OBESITY DUE TO EXCESS CALORIES: ICD-10-CM

## 2025-07-07 DIAGNOSIS — E11.69 DM TYPE 2 WITH DIABETIC DYSLIPIDEMIA: Primary | ICD-10-CM

## 2025-07-07 LAB — HBA1C MFR BLD: 6.1 %

## 2025-07-07 PROCEDURE — 1160F RVW MEDS BY RX/DR IN RCRD: CPT | Mod: CPTII,S$GLB,, | Performed by: NURSE PRACTITIONER

## 2025-07-07 PROCEDURE — 1159F MED LIST DOCD IN RCRD: CPT | Mod: CPTII,S$GLB,, | Performed by: NURSE PRACTITIONER

## 2025-07-07 PROCEDURE — 3079F DIAST BP 80-89 MM HG: CPT | Mod: CPTII,S$GLB,, | Performed by: NURSE PRACTITIONER

## 2025-07-07 PROCEDURE — 3074F SYST BP LT 130 MM HG: CPT | Mod: CPTII,S$GLB,, | Performed by: NURSE PRACTITIONER

## 2025-07-07 PROCEDURE — 3008F BODY MASS INDEX DOCD: CPT | Mod: CPTII,S$GLB,, | Performed by: NURSE PRACTITIONER

## 2025-07-07 PROCEDURE — 3044F HG A1C LEVEL LT 7.0%: CPT | Mod: CPTII,S$GLB,, | Performed by: NURSE PRACTITIONER

## 2025-07-07 PROCEDURE — 83036 HEMOGLOBIN GLYCOSYLATED A1C: CPT | Mod: QW,,, | Performed by: NURSE PRACTITIONER

## 2025-07-07 PROCEDURE — 99214 OFFICE O/P EST MOD 30 MIN: CPT | Mod: S$GLB,,, | Performed by: NURSE PRACTITIONER

## 2025-07-07 RX ORDER — TIRZEPATIDE 10 MG/.5ML
10 INJECTION, SOLUTION SUBCUTANEOUS
Qty: 4 PEN | Refills: 5 | Status: SHIPPED | OUTPATIENT
Start: 2025-07-07

## 2025-07-07 NOTE — PROGRESS NOTES
SUBJECTIVE:    Patient ID: Perla Jean is a 34 y.o. female.    Chief Complaint: Diabetes (No bottles//Pt is here for a check up//Eye exam done with eye care 20/20 in 01/2025//Foot exam ordered today//VICENTE )      History of Present Illness    Patient presents today for regular DM2 follow-up.    Pt reports overall she's doing okay. Her 1 year old was recently diagnosed with autism and she has been having to arrange a lot of appts, therapy, etc    She continues on Mounjaro 7.5 mg and metformin for DM2 though weight is up 17lbs since Feb visit- she reports she does not feel the mounjaro is reducing her appetite at all. A1C is stable at 6.1%        She continues Keppra 10 mg for previous seizure activity. Reports she has not followed up with neurology recently, denies any seizure activity.     She receives Depo-Provera injections for birth control. Reports very light to minimal cyles,  UTD with pap- Dr. Queen    She denies chest pain, breathing problems, urinary symptoms, hematuria, or other genitourinary symptoms.      ROS:  General: no fever, no chills, no fatigue, + weight gain, no weight loss  Eyes: no vision changes, no redness, no discharge  ENT: no ear pain, no nasal congestion, no sore throat  Cardiovascular: no chest pain, no palpitations, no lower extremity edema  Respiratory: no cough, no shortness of breath  Gastrointestinal: no abdominal pain, no nausea, no vomiting, no diarrhea, no constipation, no blood in stool  Genitourinary: no dysuria, no hematuria, no frequency  Musculoskeletal: no joint pain, no muscle pain  Skin: no rash, no lesion  Neurological: no headache, no dizziness, no numbness, no tingling  Psychiatric: no anxiety, no depression, no sleep difficulty        Answers submitted by the patient for this visit:  Review of Systems Questionnaire (Submitted on 6/19/2025)  activity change: No  unexpected weight change: No  neck pain: No  hearing loss: No  rhinorrhea: No  trouble swallowing:  No  eye discharge: No  visual disturbance: No  chest tightness: No  wheezing: No  chest pain: No  palpitations: No  blood in stool: No  constipation: No  vomiting: No  diarrhea: No  polydipsia: No  polyuria: No  difficulty urinating: No  hematuria: No  menstrual problem: No  dysuria: No  joint swelling: No  arthralgias: No  headaches: No  weakness: No  confusion: No  dysphoric mood: No        Office Visit on 07/07/2025   Component Date Value Ref Range Status    Hemoglobin A1C, POC 07/07/2025 6.1  % Final   Office Visit on 02/25/2025   Component Date Value Ref Range Status    Hemoglobin A1C, POC 02/25/2025 6.1  % Final       Past Medical History:   Diagnosis Date    Diabetes mellitus, type 2     Epilepsy     in childhood     Past Surgical History:   Procedure Laterality Date    CHOLECYSTECTOMY       Family History   Problem Relation Name Age of Onset    Asthma Mother Noelle Garcia     Arthritis Mother Noelle Garcia     No Known Problems Father         Tests to Keep You Healthy    Eye Exam: DUE  Cervical Cancer Screening: Met on 2/14/2023  Last HbA1c < 8 (07/07/2025): Yes      The 10-year CVD risk score (DAVIDAgoino, et al., 2008) is: 3.2%    Values used to calculate the score:      Age: 34 years      Sex: Female      Diabetic: Yes      Tobacco smoker: No      Systolic Blood Pressure: 122 mmHg      Is BP treated: No      HDL Cholesterol: 34 mg/dL      Total Cholesterol: 153 mg/dL     Marital Status: Single  Alcohol History:  reports no history of alcohol use.  Tobacco History:  reports that she has never smoked. She has never used smokeless tobacco.  Drug History:  reports no history of drug use.    Health Maintenance Topics with due status: Not Due       Topic Last Completion Date    Cervical Cancer Screening 02/14/2023    Influenza Vaccine 10/23/2024    Lipid Panel 10/23/2024    Foot Exam 07/07/2025    Hemoglobin A1c 07/07/2025    RSV Vaccine (Age 60+ and Pregnant patients) Not Due     Immunization History  "  Administered Date(s) Administered    COVID-19 MRNA, LN-S PF (MODERNA HALF 0.25 ML DOSE) 01/09/2022    COVID-19, MRNA, LN-S, PF (MODERNA FULL 0.5 ML DOSE) 05/04/2021, 06/01/2021    HPV Quadrivalent 01/24/2008    Hepatitis B 05/10/1999, 05/29/2001    Hepatitis B, Pediatric/Adolescent 11/02/2007, 01/24/2008    Influenza 12/12/2022    Influenza - Quadrivalent 12/18/2015    Influenza - Trivalent - Fluarix, Flulaval, Fluzone, Afluria - PF 10/23/2024    MMR 11/02/2007    Meningococcal Conjugate (MCV4P) 11/02/2007    PPD Test 05/29/2001    Tdap 11/02/2007    Varicella 01/24/2008       Review of patient's allergies indicates:  No Known Allergies  Current Medications[1]        Objective:      Vitals:    07/07/25 1333 07/07/25 1355   BP: 122/80    Pulse: 110 102   SpO2: 99%    Weight: 109.3 kg (241 lb)    Height: 5' 4" (1.626 m)        Physical Exam  Vitals and nursing note reviewed.   Constitutional:       General: She is not in acute distress.     Appearance: She is well-developed. She is obese.   HENT:      Head: Normocephalic and atraumatic.      Right Ear: Tympanic membrane and ear canal normal.      Left Ear: Tympanic membrane and ear canal normal.   Neck:      Vascular: No carotid bruit.   Cardiovascular:      Rate and Rhythm: Regular rhythm. Tachycardia present.      Pulses:           Dorsalis pedis pulses are 2+ on the right side and 2+ on the left side.      Heart sounds: No murmur heard.     Comments: Reports HR slightly elevated d/t carrying her 1 year son from the car  Pulmonary:      Effort: Pulmonary effort is normal. No respiratory distress.      Breath sounds: Normal breath sounds. No wheezing or rales.   Abdominal:      General: There is no distension.      Palpations: Abdomen is soft.      Tenderness: There is no abdominal tenderness.   Musculoskeletal:      Cervical back: Neck supple.      Right lower leg: No edema.      Left lower leg: No edema.      Right foot: No deformity.      Left foot: No " deformity.   Feet:      Right foot:      Protective Sensation: 5 sites tested.  5 sites sensed.      Skin integrity: No ulcer, erythema or callus.      Toenail Condition: Right toenails are normal.      Left foot:      Protective Sensation: 5 sites tested.  5 sites sensed.      Skin integrity: No ulcer, erythema or callus.      Toenail Condition: Left toenails are normal.   Lymphadenopathy:      Cervical: No cervical adenopathy.   Skin:     General: Skin is warm and dry.      Findings: No rash.   Neurological:      General: No focal deficit present.      Mental Status: She is alert and oriented to person, place, and time.      Gait: Gait normal.   Psychiatric:         Mood and Affect: Mood normal.          Assessment:       1. DM type 2 with diabetic dyslipidemia    2. Nonintractable epilepsy without status epilepticus, unspecified epilepsy type    3. Morbid obesity due to excess calories           Assessment & Plan    TYPE 2 DIABETES MELLITUS WITH DIABETIC DYSLIPIDEMIA :  - A1C is stable at 6.1  - Increased Mounjaro from 7.5 mg to 10 mg weekly to optimize glycemic control, appetite control, and weight management.  - Continued metformin at current dosage.  - Instructed the patient to contact the office if significant nausea or vomiting develops with new Mounjaro dose.  -encouraged diabetic diet compliance  - Performed diabetic foot exam  - Ordered routine labs with results expected in approximately 2 weeks.    EPILEPSY  -pt denies any recent seizure activity  -continue with keppra  -encouraged routine neuro f/u    MORBID OBESITY  -encouraged healthy diet choices and recommend she start the day with healthy breakfast with protein, try to cut out processed sugars and carbs    FOLLOW-UP:  - Scheduled follow up in 3 months to assess response to increased Mounjaro dose.        Plan:       1. DM type 2 with diabetic dyslipidemia  -     Hemoglobin A1C, POCT  -     HM DIABETES FOOT EXAM  -     tirzepatide (MOUNJARO) 10  mg/0.5 mL PnIj; Inject 10 mg into the skin every 7 days.  Dispense: 4 Pen; Refill: 5    2. Nonintractable epilepsy without status epilepticus, unspecified epilepsy type    3. Morbid obesity due to excess calories      Follow up in about 3 months (around 10/7/2025) for labs to be drawn today.          Counseled on age and gender appropriate medical preventative services, including cancer screenings, immunizations, overall nutritional health, need for a consistent exercise regimen and an overall push towards maintaining a vigorous and active lifestyle.      This note was generated with the assistance of ambient listening technology. Verbal consent was obtained by the patient and accompanying visitor(s) for the recording of patient appointment to facilitate this note. I attest to having reviewed and edited the generated note for accuracy, though some syntax or spelling errors may persist. Please contact the author of this note for any clarification.       7/7/2025 Shreya Munoz NP           [1]   Current Outpatient Medications:     BLOOD PRESSURE CUFF Misc, 1 Units by Misc.(Non-Drug; Combo Route) route 2 (two) times a day., Disp: 1 each, Rfl: 0    ibuprofen (ADVIL,MOTRIN) 600 MG tablet, Take 1 tablet (600 mg total) by mouth every 6 (six) hours as needed (cramping)., Disp: 60 tablet, Rfl: 0    levETIRAcetam (KEPPRA) 500 MG Tab, Take 500 mg by mouth 2 (two) times daily., Disp: , Rfl:     metFORMIN (GLUCOPHAGE-XR) 500 MG ER 24hr tablet, Take 1 tablet (500 mg total) by mouth daily with breakfast., Disp: 90 tablet, Rfl: 3    tirzepatide (MOUNJARO) 10 mg/0.5 mL PnIj, Inject 10 mg into the skin every 7 days., Disp: 4 Pen, Rfl: 5

## 2025-07-09 ENCOUNTER — TELEPHONE (OUTPATIENT)
Dept: FAMILY MEDICINE | Facility: CLINIC | Age: 35
End: 2025-07-09
Payer: MEDICAID

## 2025-07-09 NOTE — TELEPHONE ENCOUNTER
----- Message from [place] sent at 7/9/2025  9:07 AM CDT -----  Contact[facility]  [facility]. Needing a diagnosis code.   [phone]

## 2025-07-10 ENCOUNTER — RESULTS FOLLOW-UP (OUTPATIENT)
Dept: FAMILY MEDICINE | Facility: CLINIC | Age: 35
End: 2025-07-10
Payer: MEDICAID

## 2025-07-10 DIAGNOSIS — E78.2 MIXED HYPERLIPIDEMIA: Primary | ICD-10-CM

## 2025-07-15 RX ORDER — ROSUVASTATIN CALCIUM 10 MG/1
10 TABLET, COATED ORAL DAILY
Qty: 90 TABLET | Refills: 3 | Status: SHIPPED | OUTPATIENT
Start: 2025-07-15 | End: 2026-07-15

## 2025-07-29 ENCOUNTER — PATIENT OUTREACH (OUTPATIENT)
Dept: ADMINISTRATIVE | Facility: HOSPITAL | Age: 35
End: 2025-07-29
Payer: MEDICAID